# Patient Record
Sex: FEMALE | Race: WHITE | NOT HISPANIC OR LATINO | Employment: OTHER | URBAN - METROPOLITAN AREA
[De-identification: names, ages, dates, MRNs, and addresses within clinical notes are randomized per-mention and may not be internally consistent; named-entity substitution may affect disease eponyms.]

---

## 2023-02-27 LAB
THYROTROPIN (MIU/L) IN SER/PLAS BY DETECTION LIMIT <= 0.05 MIU/L: 2.26 MIU/L (ref 0.44–3.98)
THYROXINE (T4) FREE (NG/DL) IN SER/PLAS: 1.27 NG/DL (ref 0.78–1.48)
TRIIODOTHYRONINE (T3) FREE (PG/ML) IN SER/PLAS: 2.4 PG/ML (ref 2.3–4.2)

## 2023-04-03 DIAGNOSIS — F41.9 ANXIETY: Primary | ICD-10-CM

## 2023-04-03 DIAGNOSIS — M54.16 LUMBAR RADICULOPATHY: Primary | ICD-10-CM

## 2023-04-03 RX ORDER — GABAPENTIN 300 MG/1
300 CAPSULE ORAL 2 TIMES DAILY
Qty: 30 CAPSULE | Refills: 1 | Status: SHIPPED | OUTPATIENT
Start: 2023-04-03 | End: 2023-07-31 | Stop reason: SDUPTHER

## 2023-04-03 RX ORDER — ALPRAZOLAM 0.25 MG/1
0.25 TABLET ORAL DAILY PRN
Qty: 1 TABLET | Refills: 1 | Status: SHIPPED | OUTPATIENT
Start: 2023-04-03 | End: 2023-10-16 | Stop reason: ALTCHOICE

## 2023-04-03 RX ORDER — GABAPENTIN 300 MG/1
1 CAPSULE ORAL 2 TIMES DAILY
COMMUNITY
Start: 2023-02-27 | End: 2023-04-03 | Stop reason: SDUPTHER

## 2023-04-21 ENCOUNTER — TELEPHONE (OUTPATIENT)
Dept: PRIMARY CARE | Facility: CLINIC | Age: 73
End: 2023-04-21
Payer: MEDICARE

## 2023-04-24 ENCOUNTER — APPOINTMENT (OUTPATIENT)
Dept: PRIMARY CARE | Facility: CLINIC | Age: 73
End: 2023-04-24
Payer: MEDICARE

## 2023-04-28 ENCOUNTER — OFFICE VISIT (OUTPATIENT)
Dept: PRIMARY CARE | Facility: CLINIC | Age: 73
End: 2023-04-28
Payer: MEDICARE

## 2023-04-28 VITALS
WEIGHT: 249 LBS | TEMPERATURE: 97.6 F | HEART RATE: 72 BPM | DIASTOLIC BLOOD PRESSURE: 80 MMHG | SYSTOLIC BLOOD PRESSURE: 137 MMHG

## 2023-04-28 DIAGNOSIS — M48.061 NEUROFORAMINAL STENOSIS OF LUMBAR SPINE: ICD-10-CM

## 2023-04-28 DIAGNOSIS — M48.062 SPINAL STENOSIS OF LUMBAR REGION WITH NEUROGENIC CLAUDICATION: Primary | ICD-10-CM

## 2023-04-28 PROCEDURE — 1159F MED LIST DOCD IN RCRD: CPT | Performed by: FAMILY MEDICINE

## 2023-04-28 PROCEDURE — 1036F TOBACCO NON-USER: CPT | Performed by: FAMILY MEDICINE

## 2023-04-28 PROCEDURE — 99214 OFFICE O/P EST MOD 30 MIN: CPT | Performed by: FAMILY MEDICINE

## 2023-04-28 RX ORDER — AMLODIPINE BESYLATE 5 MG/1
5 TABLET ORAL
COMMUNITY
End: 2023-06-30 | Stop reason: SDUPTHER

## 2023-04-28 RX ORDER — BENAZEPRIL HYDROCHLORIDE 20 MG/1
20 TABLET ORAL
COMMUNITY
End: 2023-06-30 | Stop reason: SDUPTHER

## 2023-04-28 RX ORDER — GABAPENTIN 300 MG/1
300 CAPSULE ORAL 3 TIMES DAILY
Qty: 90 CAPSULE | Refills: 2 | Status: SHIPPED | OUTPATIENT
Start: 2023-04-28 | End: 2023-07-31

## 2023-04-28 RX ORDER — ATORVASTATIN CALCIUM 20 MG/1
20 TABLET, FILM COATED ORAL
COMMUNITY
End: 2023-06-30 | Stop reason: SDUPTHER

## 2023-04-28 RX ORDER — TRIAMTERENE AND HYDROCHLOROTHIAZIDE 37.5; 25 MG/1; MG/1
1 CAPSULE ORAL DAILY
COMMUNITY
Start: 2006-09-13 | End: 2023-06-30 | Stop reason: SDUPTHER

## 2023-04-28 RX ORDER — IBUPROFEN 600 MG/1
600 TABLET ORAL EVERY 6 HOURS PRN
COMMUNITY
Start: 2023-02-13 | End: 2023-10-16 | Stop reason: ALTCHOICE

## 2023-04-28 RX ORDER — METOPROLOL SUCCINATE 25 MG/1
25 TABLET, EXTENDED RELEASE ORAL DAILY
COMMUNITY
Start: 2023-03-31 | End: 2023-06-30 | Stop reason: SDUPTHER

## 2023-04-28 RX ORDER — OXYBUTYNIN CHLORIDE 5 MG/1
5 TABLET, EXTENDED RELEASE ORAL DAILY
COMMUNITY
Start: 2023-03-31 | End: 2023-06-30 | Stop reason: SDUPTHER

## 2023-04-28 RX ORDER — FUROSEMIDE 40 MG/1
40 TABLET ORAL
COMMUNITY
Start: 2023-02-13 | End: 2023-10-16 | Stop reason: ALTCHOICE

## 2023-04-28 ASSESSMENT — PATIENT HEALTH QUESTIONNAIRE - PHQ9
1. LITTLE INTEREST OR PLEASURE IN DOING THINGS: NOT AT ALL
SUM OF ALL RESPONSES TO PHQ9 QUESTIONS 1 AND 2: 0
2. FEELING DOWN, DEPRESSED OR HOPELESS: NOT AT ALL

## 2023-04-28 NOTE — PATIENT INSTRUCTIONS
Follow-up with medical spine for further evaluation  Increase gabapentin to 300 mg 3 times a day  Follow-up if symptoms worsen or she develops numbness tingling weakness incontinence of urine incontinence of stool

## 2023-04-28 NOTE — PROGRESS NOTES
72-year-old female with history of scoliosis and obesity was seen in February 2023 for left burning leg pain which had improved with gabapentin.  Underwent MRI which showed severe lumbar degenerative disc disease at multiple levels with canal stenosis and multilevel neuroforaminal stenosis.  She had stopped her gabapentin several days ago and has noticed increased pain.  She denies incontinence of urine incontinence of stool.  When she was on gabapentin 30 mg twice a day she had significantly improved but not complete pain relief        /80 (BP Location: Left arm, Patient Position: Sitting, BP Cuff Size: Large adult)   Pulse 72   Temp 36.4 °C (97.6 °F) (Temporal)   Wt 113 kg (249 lb)     Obese  Pleasant appears comfortable today ambulating with a limp with a cane  Skin without pallor or cyanosis  Abdomen nontender  No CVA tenderness  No midline lumbar tenderness paralumbar tenderness or spasm  Negative leg raise negative leg rolling  Quadriceps hamstrings foot dorsiflexion foot plantarflexion 5 out of 5 bilaterally  Patella and Achilles reflexes 1+ bilaterally  Bilateral chronic lower extremity edema with palpable pulses

## 2023-05-01 ENCOUNTER — TELEPHONE (OUTPATIENT)
Dept: PRIMARY CARE | Facility: CLINIC | Age: 73
End: 2023-05-01
Payer: MEDICARE

## 2023-05-01 NOTE — TELEPHONE ENCOUNTER
Patient was in on 4.28.23. She got a handwritten order for 5 year Disability Placard on prescription pad. The order was accidentally dated for 2024 instead of 2023. Can she please have a new order dated correctly mailed to her?

## 2023-05-04 NOTE — TELEPHONE ENCOUNTER
Follow up appointment on 4/28/23.----- Message from Richardson Givens MD sent at 4/19/2023 10:03 AM EDT -----  MRI is abnormal showing lumbar canal stenosis degenerative disc disease lumbar disc protrusions so she needs follow-up appointment.  Please schedule

## 2023-06-01 ENCOUNTER — TELEPHONE (OUTPATIENT)
Dept: PRIMARY CARE | Facility: CLINIC | Age: 73
End: 2023-06-01
Payer: MEDICARE

## 2023-06-01 NOTE — TELEPHONE ENCOUNTER
I will not be able to see her until I return.  The right leg pain can be related to her spine issues.

## 2023-06-01 NOTE — TELEPHONE ENCOUNTER
Patient said she is experiencing extreme pain in right leg in the thigh area. Patient can't sit because it hurts so much. She wondering if it could it be related to her spine issues?    She said she couldn't get in to see spine specialist until June 30th. She wouldn't mind coming in if she needed that, but it might have to be virtual because she can't drive for more than 10 minutes at a time.

## 2023-06-08 NOTE — TELEPHONE ENCOUNTER
I told her the pain could relate to her spine. She is going to look and see if she can get on a waiting list. She didn't want to schedule a follow up at this time

## 2023-06-30 DIAGNOSIS — I10 ESSENTIAL HYPERTENSION: ICD-10-CM

## 2023-06-30 DIAGNOSIS — R32 URINARY INCONTINENCE, UNSPECIFIED TYPE: ICD-10-CM

## 2023-06-30 DIAGNOSIS — E78.5 HYPERLIPIDEMIA, UNSPECIFIED HYPERLIPIDEMIA TYPE: Primary | ICD-10-CM

## 2023-06-30 RX ORDER — ATORVASTATIN CALCIUM 20 MG/1
20 TABLET, FILM COATED ORAL
Qty: 90 TABLET | Refills: 0 | Status: SHIPPED | OUTPATIENT
Start: 2023-06-30 | End: 2023-10-06

## 2023-06-30 RX ORDER — BENAZEPRIL HYDROCHLORIDE 20 MG/1
20 TABLET ORAL
Qty: 90 TABLET | Refills: 0 | Status: SHIPPED | OUTPATIENT
Start: 2023-06-30 | End: 2023-10-06

## 2023-06-30 RX ORDER — AMLODIPINE BESYLATE 5 MG/1
5 TABLET ORAL
Qty: 90 TABLET | Refills: 0 | Status: SHIPPED | OUTPATIENT
Start: 2023-06-30 | End: 2023-10-06

## 2023-06-30 RX ORDER — METOPROLOL SUCCINATE 25 MG/1
25 TABLET, EXTENDED RELEASE ORAL DAILY
Qty: 90 TABLET | Refills: 0 | Status: SHIPPED | OUTPATIENT
Start: 2023-06-30 | End: 2023-10-06

## 2023-06-30 RX ORDER — OXYBUTYNIN CHLORIDE 5 MG/1
5 TABLET, EXTENDED RELEASE ORAL DAILY
Qty: 90 TABLET | Refills: 0 | Status: SHIPPED | OUTPATIENT
Start: 2023-06-30 | End: 2023-10-06

## 2023-06-30 RX ORDER — TRIAMTERENE AND HYDROCHLOROTHIAZIDE 37.5; 25 MG/1; MG/1
1 CAPSULE ORAL DAILY
Qty: 90 CAPSULE | Refills: 0 | Status: SHIPPED | OUTPATIENT
Start: 2023-06-30 | End: 2023-10-06

## 2023-07-31 DIAGNOSIS — M54.16 LUMBAR RADICULOPATHY: ICD-10-CM

## 2023-07-31 RX ORDER — GABAPENTIN 300 MG/1
300 CAPSULE ORAL 2 TIMES DAILY
Qty: 30 CAPSULE | Refills: 1 | Status: SHIPPED | OUTPATIENT
Start: 2023-07-31 | End: 2023-08-01 | Stop reason: SDUPTHER

## 2023-08-01 DIAGNOSIS — M54.16 LUMBAR RADICULOPATHY: ICD-10-CM

## 2023-08-01 RX ORDER — GABAPENTIN 300 MG/1
600 CAPSULE ORAL 2 TIMES DAILY
Qty: 120 CAPSULE | Refills: 3 | Status: SHIPPED | OUTPATIENT
Start: 2023-08-01 | End: 2023-08-01 | Stop reason: SDUPTHER

## 2023-08-01 RX ORDER — GABAPENTIN 300 MG/1
600 CAPSULE ORAL 2 TIMES DAILY
Qty: 120 CAPSULE | Refills: 3 | Status: SHIPPED | OUTPATIENT
Start: 2023-08-01 | End: 2023-12-12 | Stop reason: SDUPTHER

## 2023-08-07 ENCOUNTER — HOSPITAL ENCOUNTER (OUTPATIENT)
Dept: DATA CONVERSION | Facility: HOSPITAL | Age: 73
End: 2023-08-07
Attending: PHYSICAL MEDICINE & REHABILITATION | Admitting: PHYSICAL MEDICINE & REHABILITATION
Payer: MEDICARE

## 2023-08-07 DIAGNOSIS — M48.062 SPINAL STENOSIS, LUMBAR REGION WITH NEUROGENIC CLAUDICATION: ICD-10-CM

## 2023-09-29 VITALS
DIASTOLIC BLOOD PRESSURE: 80 MMHG | HEART RATE: 57 BPM | TEMPERATURE: 96.8 F | SYSTOLIC BLOOD PRESSURE: 155 MMHG | RESPIRATION RATE: 16 BRPM

## 2023-09-30 NOTE — H&P
History of Present Illness:   History Present Illness:  Reason for surgery: Lumbar stenosis with radiculopathy  and neurogenic claudication   HPI:    Patient reports no change in symptoms since  H&P/previous evaluation.  No f/c/s, no change in medical problems or comorbidities.  Heart and lung evaluation is normal   and neurological examination is unchanged from previous .  Pain 7-8/10 at rest.      Allergies:        Allergies:  ·  No Known Allergies :     Home Medication Review:   Home Medications Reviewed: yes     Impression/Procedure:   ·  Impression and Planned Procedure: Lumbar stenosis with radiculopathy and neurogenic claudication for Right l3-4 and L4-5 TFESIs       ERAS (Enhanced Recovery After Surgery):  ·  ERAS Patient: no       Vital Signs:  Temperature C: 36 degrees C   Temperature F: 96.8 degrees F   Heart Rate: 57 beats per minute   Respiratory Rate: 16 breath per minute   Blood Pressure Systolic: 155 mm/Hg   Blood Pressure Diastolic: 80 mm/Hg     Physical Exam by System:    Constitutional: Well developed, awake/alert/oriented  x3, no distress, alert and cooperative   ENMT: mucous membranes moist, no apparent injury,  no lesions seen   Respiratory/Thorax: Patent airways, CTAB, normal  breath sounds with good chest expansion, thorax symmetric   Cardiovascular: Regular, rate and rhythm, no murmurs,  2+ equal pulses of the extremities, normal S 1and S 2   Musculoskeletal: ROM adeqate, no signficant joint  warmth or swelling   Neurological: alert and oriented x3, motor normal  strength in LEs or unchanged from previous. Confirmed nl neuro   Psychological: Appropriate mood and behavior   Skin: Warm and dry, no lesions, no rashes     Consent:   COVID-19 Consent:  ·  COVID-19 Risk Consent Surgeon has reviewed key risks related to the risk of val COVID-19 and if they contract COVID-19 what the risks are.       Electronic Signatures:  Reigno Ambrose)  (Signed 07-Aug-2023 13:19)   Authored:  History of Present Illness, Allergies, Home  Medication Review, Impression/Procedure, ERAS, Physical Exam, Consent, Note Completion      Last Updated: 07-Aug-2023 13:19 by Regino Ambrose)

## 2023-10-01 NOTE — OP NOTE
PROCEDURE DETAILS    Preoperative Diagnosis:  Neurogenic claudication due to lumbar spinal stenosis, M48.062    Postoperative Diagnosis:  Neurogenic claudication due to lumbar spinal stenosis, M48.062    Surgeon: Regino Ambrose  Resident/Fellow/Other Assistant: None of these were associated with this case    Procedure:  1.  RIGHT L3-4 AND L4-5 TRANSFORAMINAL EPIDURAL STEROID INJECTION WITH FLUORO AND IV SEDATION     Anesthesia: No anesthesiologist associated with this case  Estimated Blood Loss: 0  Findings: below        Operative Report:   Sedation: None    The patient was then taken back to the procedure room and was placed in a prone position and routine noninvasive monitors were applied.  A timeout was performed verifying patient identification, site and allergies.   The back was prepped and draped in  a sterile fashion. Under direct fluoroscopic visualization, the Right L4 transverse process was identified. The skin and subcutaneous tissues were anesthetized with 2cc of lidocaine and 0.5 cc sodium bicarbonate . Under direct fluoroscopic guidance,   a 5 inch, 22 gauge spinal needle was directed obliquely to contact the inferior aspect of the transverse process and then directed to enter the neural foramen.  Needle placement was confirmed on both AP and lateral views prior to the contrast injection.  Omnipaque 2 cc was injected,  confirming epiradicular flow pattern and highlighting the L4 spinal nerve. The injection was completed with 0.75  cc of DEXAMETHASONE 10mg/cc and 2.5 cc of lidocaine, preservative free.    Attention was then turned to the Right L3 transverse process,  and the subcutaneous tissue anesthetized in the same fashion as above. Under direct fluoroscopic guidance,  a 5  inch, 22 gauge spinal needle was directed obliquely to contact the inferior  aspect of the transverse process and then directed to enter the neural foramen.  Needle placement was confirmed on both AP and lateral views  prior to the contrast injection. Omnipaque 1 cc was injected,  confirming epiradicular flow pattern and highlighting  the L3 spinal nerve. The injection was completed with 0.75  cc of DEXAMETHASONE 10mg/cc and 2.5 cc of 2% lidocaine, preservative free.    The patient tolerated the procedure well and without complications and was taken back to the recovery area in good condition.  Post procedure care,  precautions and instructions given and patient verbalized understanding.                        Attestation:   Note Completion:  Attending Attestation I performed the procedure without a resident         Electronic Signatures:  Regino Ambrose)  (Signed 07-Aug-2023 14:29)   Authored: Post-Operative Note, Chart Review, Note Completion      Last Updated: 07-Aug-2023 14:29 by Regino Ambrose)

## 2023-10-05 DIAGNOSIS — R32 URINARY INCONTINENCE, UNSPECIFIED TYPE: ICD-10-CM

## 2023-10-05 DIAGNOSIS — E78.5 HYPERLIPIDEMIA, UNSPECIFIED HYPERLIPIDEMIA TYPE: ICD-10-CM

## 2023-10-05 DIAGNOSIS — I10 ESSENTIAL HYPERTENSION: ICD-10-CM

## 2023-10-06 DIAGNOSIS — I10 ESSENTIAL (PRIMARY) HYPERTENSION: ICD-10-CM

## 2023-10-06 DIAGNOSIS — N17.9 ACUTE KIDNEY FAILURE, UNSPECIFIED (CMS-HCC): Primary | ICD-10-CM

## 2023-10-06 RX ORDER — OXYBUTYNIN CHLORIDE 5 MG/1
5 TABLET, EXTENDED RELEASE ORAL DAILY
Qty: 90 TABLET | Refills: 0 | Status: SHIPPED | OUTPATIENT
Start: 2023-10-06

## 2023-10-06 RX ORDER — TRIAMTERENE AND HYDROCHLOROTHIAZIDE 37.5; 25 MG/1; MG/1
1 CAPSULE ORAL DAILY
Qty: 90 CAPSULE | Refills: 0 | Status: SHIPPED | OUTPATIENT
Start: 2023-10-06 | End: 2023-12-13 | Stop reason: SDUPTHER

## 2023-10-06 RX ORDER — ATORVASTATIN CALCIUM 20 MG/1
20 TABLET, FILM COATED ORAL DAILY
Qty: 90 TABLET | Refills: 0 | Status: SHIPPED | OUTPATIENT
Start: 2023-10-06 | End: 2024-04-02 | Stop reason: SDUPTHER

## 2023-10-06 RX ORDER — BENAZEPRIL HYDROCHLORIDE 20 MG/1
20 TABLET ORAL DAILY
Qty: 90 TABLET | Refills: 0 | Status: SHIPPED | OUTPATIENT
Start: 2023-10-06 | End: 2023-12-13 | Stop reason: SDUPTHER

## 2023-10-06 RX ORDER — METOPROLOL SUCCINATE 25 MG/1
25 TABLET, EXTENDED RELEASE ORAL DAILY
Qty: 90 TABLET | Refills: 0 | Status: SHIPPED | OUTPATIENT
Start: 2023-10-06 | End: 2023-12-12 | Stop reason: WASHOUT

## 2023-10-06 RX ORDER — AMLODIPINE BESYLATE 5 MG/1
5 TABLET ORAL DAILY
Qty: 90 TABLET | Refills: 0 | Status: SHIPPED | OUTPATIENT
Start: 2023-10-06 | End: 2023-12-13 | Stop reason: SDUPTHER

## 2023-10-13 PROBLEM — M51.37 DEGENERATIVE DISC DISEASE AT L5-S1 LEVEL: Status: ACTIVE | Noted: 2023-10-13

## 2023-10-13 PROBLEM — M51.379 DEGENERATIVE DISC DISEASE AT L5-S1 LEVEL: Status: ACTIVE | Noted: 2023-10-13

## 2023-10-13 PROBLEM — R93.7 ABNORMAL BONE XRAY: Status: ACTIVE | Noted: 2023-10-13

## 2023-10-13 PROBLEM — M54.16 LUMBAR RADICULAR PAIN: Status: ACTIVE | Noted: 2023-10-13

## 2023-10-13 PROBLEM — M48.062 SPINAL STENOSIS OF LUMBAR REGION WITH NEUROGENIC CLAUDICATION: Status: ACTIVE | Noted: 2023-10-13

## 2023-10-13 PROBLEM — M17.11 PRIMARY OSTEOARTHRITIS OF RIGHT KNEE: Status: ACTIVE | Noted: 2023-10-13

## 2023-10-13 RX ORDER — TRAMADOL HYDROCHLORIDE 50 MG/1
1 TABLET ORAL EVERY 6 HOURS
COMMUNITY
Start: 2023-02-14 | End: 2023-10-16 | Stop reason: ALTCHOICE

## 2023-10-13 RX ORDER — IBUPROFEN 800 MG/1
800 TABLET ORAL EVERY 6 HOURS PRN
COMMUNITY
Start: 2023-02-09 | End: 2023-10-16 | Stop reason: ALTCHOICE

## 2023-10-13 RX ORDER — NABUMETONE 500 MG/1
1 TABLET, FILM COATED ORAL 2 TIMES DAILY
COMMUNITY
Start: 2023-02-13 | End: 2023-10-16 | Stop reason: ALTCHOICE

## 2023-10-13 RX ORDER — HYDROCODONE BITARTRATE AND ACETAMINOPHEN 5; 325 MG/1; MG/1
1 TABLET ORAL EVERY 6 HOURS PRN
COMMUNITY
Start: 2023-02-20 | End: 2023-10-16 | Stop reason: ALTCHOICE

## 2023-10-13 RX ORDER — METHYLPREDNISOLONE 4 MG/1
TABLET ORAL
COMMUNITY
Start: 2023-02-27 | End: 2023-10-16 | Stop reason: ALTCHOICE

## 2023-10-16 ENCOUNTER — OFFICE VISIT (OUTPATIENT)
Dept: PRIMARY CARE | Facility: CLINIC | Age: 73
End: 2023-10-16
Payer: MEDICARE

## 2023-10-16 ENCOUNTER — OFFICE VISIT (OUTPATIENT)
Dept: ORTHOPEDIC SURGERY | Facility: CLINIC | Age: 73
End: 2023-10-16
Payer: MEDICARE

## 2023-10-16 ENCOUNTER — LAB (OUTPATIENT)
Dept: LAB | Facility: LAB | Age: 73
End: 2023-10-16
Payer: MEDICARE

## 2023-10-16 VITALS
HEIGHT: 64 IN | DIASTOLIC BLOOD PRESSURE: 62 MMHG | TEMPERATURE: 96.9 F | SYSTOLIC BLOOD PRESSURE: 95 MMHG | WEIGHT: 252 LBS | HEART RATE: 56 BPM | OXYGEN SATURATION: 95 % | BODY MASS INDEX: 43.02 KG/M2

## 2023-10-16 DIAGNOSIS — M17.11 PRIMARY OSTEOARTHRITIS OF RIGHT KNEE: ICD-10-CM

## 2023-10-16 DIAGNOSIS — I10 ESSENTIAL (PRIMARY) HYPERTENSION: ICD-10-CM

## 2023-10-16 DIAGNOSIS — I10 ESSENTIAL HYPERTENSION: Primary | ICD-10-CM

## 2023-10-16 DIAGNOSIS — M51.37 DEGENERATIVE DISC DISEASE AT L5-S1 LEVEL: ICD-10-CM

## 2023-10-16 DIAGNOSIS — N18.32 STAGE 3B CHRONIC KIDNEY DISEASE (MULTI): ICD-10-CM

## 2023-10-16 DIAGNOSIS — M79.18 CHRONIC MYOFASCIAL PAIN: Primary | ICD-10-CM

## 2023-10-16 DIAGNOSIS — G89.29 CHRONIC MYOFASCIAL PAIN: Primary | ICD-10-CM

## 2023-10-16 DIAGNOSIS — N17.9 ACUTE KIDNEY FAILURE, UNSPECIFIED (CMS-HCC): ICD-10-CM

## 2023-10-16 DIAGNOSIS — I10 ESSENTIAL HYPERTENSION: ICD-10-CM

## 2023-10-16 LAB
ERYTHROCYTE [DISTWIDTH] IN BLOOD BY AUTOMATED COUNT: 14.9 % (ref 11.5–14.5)
HCT VFR BLD AUTO: 40.5 % (ref 36–46)
HGB BLD-MCNC: 12.5 G/DL (ref 12–16)
MCH RBC QN AUTO: 27.1 PG (ref 26–34)
MCHC RBC AUTO-ENTMCNC: 30.9 G/DL (ref 32–36)
MCV RBC AUTO: 88 FL (ref 80–100)
NRBC BLD-RTO: 0 /100 WBCS (ref 0–0)
PLATELET # BLD AUTO: 240 X10*3/UL (ref 150–450)
PMV BLD AUTO: 10.1 FL (ref 7.5–11.5)
RBC # BLD AUTO: 4.61 X10*6/UL (ref 4–5.2)
WBC # BLD AUTO: 9.7 X10*3/UL (ref 4.4–11.3)

## 2023-10-16 PROCEDURE — 80053 COMPREHEN METABOLIC PANEL: CPT

## 2023-10-16 PROCEDURE — 85027 COMPLETE CBC AUTOMATED: CPT

## 2023-10-16 PROCEDURE — 36415 COLL VENOUS BLD VENIPUNCTURE: CPT

## 2023-10-16 PROCEDURE — 1036F TOBACCO NON-USER: CPT | Performed by: PHYSICAL MEDICINE & REHABILITATION

## 2023-10-16 PROCEDURE — 3078F DIAST BP <80 MM HG: CPT | Performed by: FAMILY MEDICINE

## 2023-10-16 PROCEDURE — 80061 LIPID PANEL: CPT

## 2023-10-16 PROCEDURE — 99214 OFFICE O/P EST MOD 30 MIN: CPT | Performed by: FAMILY MEDICINE

## 2023-10-16 PROCEDURE — 3074F SYST BP LT 130 MM HG: CPT | Performed by: FAMILY MEDICINE

## 2023-10-16 PROCEDURE — 1159F MED LIST DOCD IN RCRD: CPT | Performed by: PHYSICAL MEDICINE & REHABILITATION

## 2023-10-16 PROCEDURE — 99213 OFFICE O/P EST LOW 20 MIN: CPT | Performed by: PHYSICAL MEDICINE & REHABILITATION

## 2023-10-16 PROCEDURE — 1036F TOBACCO NON-USER: CPT | Performed by: FAMILY MEDICINE

## 2023-10-16 PROCEDURE — 1160F RVW MEDS BY RX/DR IN RCRD: CPT | Performed by: FAMILY MEDICINE

## 2023-10-16 PROCEDURE — 1159F MED LIST DOCD IN RCRD: CPT | Performed by: FAMILY MEDICINE

## 2023-10-16 RX ORDER — METHOCARBAMOL 500 MG/1
TABLET, FILM COATED ORAL
Qty: 60 TABLET | Refills: 2 | Status: SHIPPED | OUTPATIENT
Start: 2023-10-16 | End: 2023-12-01 | Stop reason: SDUPTHER

## 2023-10-16 ASSESSMENT — PATIENT HEALTH QUESTIONNAIRE - PHQ9
1. LITTLE INTEREST OR PLEASURE IN DOING THINGS: NOT AT ALL
SUM OF ALL RESPONSES TO PHQ9 QUESTIONS 1 & 2: 0
2. FEELING DOWN, DEPRESSED OR HOPELESS: NOT AT ALL

## 2023-10-16 ASSESSMENT — COLUMBIA-SUICIDE SEVERITY RATING SCALE - C-SSRS
2. HAVE YOU ACTUALLY HAD ANY THOUGHTS OF KILLING YOURSELF?: NO
1. IN THE PAST MONTH, HAVE YOU WISHED YOU WERE DEAD OR WISHED YOU COULD GO TO SLEEP AND NOT WAKE UP?: NO
6. HAVE YOU EVER DONE ANYTHING, STARTED TO DO ANYTHING, OR PREPARED TO DO ANYTHING TO END YOUR LIFE?: NO

## 2023-10-16 ASSESSMENT — LIFESTYLE VARIABLES
HOW MANY STANDARD DRINKS CONTAINING ALCOHOL DO YOU HAVE ON A TYPICAL DAY: 1 OR 2
HOW OFTEN DO YOU HAVE SIX OR MORE DRINKS ON ONE OCCASION: NEVER
SKIP TO QUESTIONS 9-10: 1
AUDIT-C TOTAL SCORE: 2
HOW OFTEN DO YOU HAVE A DRINK CONTAINING ALCOHOL: 2-4 TIMES A MONTH

## 2023-10-16 NOTE — PROGRESS NOTES
Follow Up Note    Today's Date:   10/16/2023     Assessment: Very pleasant 72-year-old female with chronic right knee pain and posterior thigh pain. Also history of severe lumbar central canal stenosis and multilevel degenerative changes throughout the lumbosacral spine. Pain can be intermittently severe and impact her daily activity. Fortunately she denies any worsening neurological deficits at this time.    10/16/23 Update: Has not started PT, recommended she start. Will add further medications to conservative management. Pain likely myofascial in nature, possible element of radicular symptoms and/or osteoarthritis.  New Rx provided for methocarbamol as needed-advised potential sedate of side effects.    -Chronic myofascial pain  -Right knee osteoarthritis  -Severe lumbar stenosis and foraminal narrowing     PLAN:  1)  Imaging/Diagnostic Studies: X-ray right knee 8- shows mild tricompartmental osteoarthritis.  MRI lumbar from 04/2023 reviewed at last visit.   2)  Therapy/Rehabilitation: Still recommend PT, referral in place from last visit.  3)  Pharmacological Management: Start Robaxin PRN.  Discuss increasing gabapentin with original prescriber.  4)  Spine/Surgical Interventions: Consider repeat lumbar KAUSHAL if not improved.  5)  Alternative Treatments: May consider alternative treatment options in the future including manipulation (chiropractor versus osteopathic) and/or acupuncture if patient does not obtain optimal relief with initial treatment plan.  6)  Consultations: PT  7)  Follow -up: as needed  8)  Future treatment considerations: Consider lower extremity EMG if not improving.  Consider right knee corticosteroid injection.    Patient advised of the difference between hurt and harm and advised to continue with all normal activities and exercises. Patient verbalized understanding of the above plan and was happy with the care provided.      The above clinical summary has been dictated with voice  recognition software. It has not been proofread for grammatical errors, typographical mistakes, or other semantic inconsistencies.    Thank you for visiting our office today. It was our pleasure to take part in your healthcare.     Do not hesitate to call with any questions regarding your plan of care after leaving at [phone]    To clinicians, thank you very much for this kind referral. It is a privilege to partner with you in the care of your patients. My office would be delighted to assist you with any further consultations or with questions regarding the plan of care outlined. Do not hesitate to call the office or contact me directly.     Sincerely,    BRENDA Blue MD  , Physical Medicine and Rehabilitation, Orthopedic Spine  [facility] [facility] [employer]  [facility]    Seen with resident Dr. Shukla, note above and below is a joint effort in all areas.    I saw and evaluated the patient. I personally obtained the key and critical portions of the history and physical exam. I reviewed the resident's documentation and discussed the patient with the resident. I agree with the resident's medical decision making as documented in the resident's note, with my additions.      Paty Maldonado  is a 72 y.o. female   who was last seen 8/27/23.  Since the last visit the pain is unchanged. Still complains of low back pain, described as aching. Non-radiating. Still complains of R posterior thigh and R knee pain. Cannot fully flex knee. Slightly improved with Aleve. Takes gabapentin for separate neuropathy. She did not go to PT as recommended at last visit. Had lumbar KAUSHAL with Dr. Ambrose on 8/7/23.     TREATMENTS  IN THE LAST SIX MONTHS     Active conservative therapy  in the last six months (see below)              1. Physical therapy: no                                                                                    2. Home exercise program after  PT: no                                                     3. A physician supervised home exercise program (HEP): no               4. Chiropractic Care: no                                                                    Passive conservative therapy  in the last six months (see below)              1. NSAIDS: Aleve                                                                                                           2. Prescription pain medication: no                                                             3. Acupuncture: no                                                                                           4. Tens unit: no     Denies numbness/tingling. Patient denies bowel/bladder incontinence, denies fever, denies unintentional weight loss, denies clumsiness of hands, feet, or dropping things.  Denies any constitutional or myelopathic symptomatology.     ROS: Other than listed in HPI, PMHX below, the patient denies any complaint of the following: night pain, joint swelling, myalgias, cough, fainting, vision or language changes, shortness of breath, chest pain, nausea, vomiting or diarrhea, rash, dysuria, seizures, excessive sweating, or bleeding problems.    I have confirmed and edited as necessary Past Medical, Past Surgical, Family, Social History and ROS as obtained by others. No new sig. changes since last visit.       PHYSICAL EXAM:   GENERAL APPEARANCE:  Well nourished, well developed, and no apparent distress.  NEURO PSYCH: Patient oriented to person, place, Mood pleasant. Benign affect.  MUSCULOSKELETAL and NEUROLOGICAL       VISUAL INSPECTION           LUMBAR: WNL  SPINE ROM:   LUMBAR ROM: Mildly limited R lateral flexion and Rward rotation       PALPATION:           SPINOUS PROCESS: Nontender           PARASPINALS: Nontender           R KNEE: No significant effusion, mild peripatellar  and medial joint line tenderness.  FACET LOADING: Negative  MUSCLE BULK: Normal and symmetrical in the  lower extremities.  MUSCLE TONE: Normal  MOTOR: 5/5 in all muscle groups tested in bilateral lower extremities   SENSORY: Normal sensory exam to light touch  GAIT: Antalgic, favoring L leg. Trendelenburg gait, with L hip dropping.   STRAIGHT LEG TEST: Neg  PERIPHERAL JOINT ROM:   HIP ROM: Full bilaterally  KNEE ROM: Full bilaterally  KNEE ANT/POST DRAWER & VALGUS/VARUS STRESS: Negative for laxity b/l  TAYA: Negative b/l  MONICA/Thigh Thrust/Compression/Rudolph Finger:  Negative bilaterally   Hip Exam including thigh thrust and LOG ROLL: Negative bilaterally      DATA REVIEW:   The below imaging studies were personally reviewed and discussed with the patient.    Medical Decision Making:  The above note constitutes a Moderate to High level of medical decision making based on past data and imaging review, new and chronic symptoms with exacerbation, change in weakness or sensation, new imaging and diagnostic studies ordered, discussion of potential interventional or surgical treatment options, acute or chronic pain that may pose a threat to bodily function.    Current Outpatient Medications on File Prior to Visit   Medication Sig Dispense Refill    ALPRAZolam (Xanax) 0.25 mg tablet Take 1 tablet (0.25 mg) by mouth once daily as needed for anxiety for up to 1 day. Take 1 hour before MRI 1 tablet 1    amLODIPine (Norvasc) 5 mg tablet TAKE 1 TABLET ONE TIME DAILY 90 tablet 0    atorvastatin (Lipitor) 20 mg tablet TAKE 1 TABLET ONE TIME DAILY 90 tablet 0    benazepril (Lotensin) 20 mg tablet TAKE 1 TABLET ONE TIME DAILY 90 tablet 0    furosemide (Lasix) 40 mg tablet Take 1 tablet (40 mg) by mouth once daily.      gabapentin (Neurontin) 300 mg capsule Take 2 capsules (600 mg) by mouth 2 times a day. 120 capsule 3    HYDROcodone-acetaminophen (Norco) 5-325 mg tablet Take 1 tablet by mouth every 6 hours if needed for severe pain (7 - 10).      ibuprofen 600 mg tablet Take 1 tablet (600 mg) by mouth every 6 hours if needed.       ibuprofen 800 mg tablet Take 1 tablet (800 mg) by mouth every 6 hours if needed.      methylPREDNISolone (Medrol Dospak) 4 mg tablets Take by mouth.      metoprolol succinate XL (Toprol-XL) 25 mg 24 hr tablet TAKE 1 TABLET ONE TIME DAILY 90 tablet 0    nabumetone (Relafen) 500 mg tablet Take 1 tablet (500 mg) by mouth 2 times a day.      oxybutynin XL (Ditropan-XL) 5 mg 24 hr tablet TAKE 1 TABLET ONE TIME DAILY 90 tablet 0    traMADol (Ultram) 50 mg tablet Take 1 tablet (50 mg) by mouth every 6 hours.      triamterene-hydrochlorothiazid (Dyazide) 37.5-25 mg capsule TAKE 1 CAPSULE EVERY DAY 90 capsule 0     No current facility-administered medications on file prior to visit.        No past medical history on file.     No past surgical history on file.     No Known Allergies

## 2023-10-16 NOTE — PROGRESS NOTES
"72-year-old female with history of scoliosis and obesity was seen in February 2023 for left burning leg pain which had improved with gabapentin.  Underwent MRI which showed severe lumbar degenerative disc disease at multiple levels with canal stenosis and multilevel neuroforaminal stenosis.  She had stopped her gabapentin several days ago and has noticed increased pain.  She denies incontinence of urine incontinence of stool.  When she was on gabapentin 600 mg twice a day she had significantly improved but not complete pain relief. She had epidural ijection x 1 without improvement    She is here for blood pressure follow-up as well.  Blood pressure is notably low complaining of some lightheadedness at times but denies chest pain SOB WILKERSON diaphoresis nausea palpitations syncope presyncope orthopnea edema leg pain dysarthria aphasia focal weakness headache numbness tingling  visual disturbance gait disturbance polydipsia polyuria weight loss tremor epistaxis melena rectal bleeding tremor fatigue weight change temperature intolerance.        BP 95/62   Pulse 56   Temp 36.1 °C (96.9 °F)   Ht 1.626 m (5' 4\")   Wt 114 kg (252 lb)   SpO2 95%   BMI 43.26 kg/m²       Appears comfortable  No retractions  Skin without pallor petechia icterus cyanosis  Neck without JVD thyromegaly bruits  Chest wall nontender  Chest clear to auscultation without rales rhonchi wheeze  Heart regular rate and rhythm without murmur  Abdomen soft nondistended nontender without organomegaly or mass  Extremities without erythema edema Homans or cord  Peripheral pulses palpable  Neuro intact        "

## 2023-10-17 LAB
ALBUMIN SERPL BCP-MCNC: 4.2 G/DL (ref 3.4–5)
ALP SERPL-CCNC: 128 U/L (ref 33–136)
ALT SERPL W P-5'-P-CCNC: 15 U/L (ref 7–45)
ANION GAP SERPL CALC-SCNC: 14 MMOL/L (ref 10–20)
AST SERPL W P-5'-P-CCNC: 16 U/L (ref 9–39)
BILIRUB SERPL-MCNC: 0.8 MG/DL (ref 0–1.2)
BUN SERPL-MCNC: 45 MG/DL (ref 6–23)
CALCIUM SERPL-MCNC: 9.8 MG/DL (ref 8.6–10.6)
CHLORIDE SERPL-SCNC: 107 MMOL/L (ref 98–107)
CHOLEST SERPL-MCNC: 146 MG/DL (ref 0–199)
CHOLESTEROL/HDL RATIO: 3.8
CO2 SERPL-SCNC: 26 MMOL/L (ref 21–32)
CREAT SERPL-MCNC: 1.4 MG/DL (ref 0.5–1.05)
GFR SERPL CREATININE-BSD FRML MDRD: 40 ML/MIN/1.73M*2
GLUCOSE SERPL-MCNC: 104 MG/DL (ref 74–99)
HDLC SERPL-MCNC: 38.7 MG/DL
LDLC SERPL CALC-MCNC: 86 MG/DL
NON HDL CHOLESTEROL: 107 MG/DL (ref 0–149)
POTASSIUM SERPL-SCNC: 4.4 MMOL/L (ref 3.5–5.3)
PROT SERPL-MCNC: 7.1 G/DL (ref 6.4–8.2)
SODIUM SERPL-SCNC: 143 MMOL/L (ref 136–145)
TRIGL SERPL-MCNC: 107 MG/DL (ref 0–149)
VLDL: 21 MG/DL (ref 0–40)

## 2023-12-01 DIAGNOSIS — M79.18 CHRONIC MYOFASCIAL PAIN: ICD-10-CM

## 2023-12-01 DIAGNOSIS — G89.29 CHRONIC MYOFASCIAL PAIN: ICD-10-CM

## 2023-12-01 RX ORDER — METHOCARBAMOL 500 MG/1
TABLET, FILM COATED ORAL
Qty: 60 TABLET | Refills: 2 | Status: SHIPPED | OUTPATIENT
Start: 2023-12-01 | End: 2024-04-16 | Stop reason: WASHOUT

## 2023-12-11 ENCOUNTER — TELEPHONE (OUTPATIENT)
Dept: PRIMARY CARE | Facility: CLINIC | Age: 73
End: 2023-12-11
Payer: MEDICARE

## 2023-12-11 NOTE — TELEPHONE ENCOUNTER
Pt is coming in tomorrow if you wanted to wait. She wants to get these through mail order now that is why she is requesting a bit early.    Name:  Paty Maldonado  :  [id number]  Medication Name:  Amlodipine  Dose : 5 Mg  Route : tablet  Frequency : 1 per day  Quantity : 90 tablets  Directions : Take one tablet one time daily  Specific Pharmacy location:  [facility], on file  Date of last appointment:  10/16/23  Date of next appointment:  23    Medication Name:  Triamterene-Hydrochlorothiazide  Dose : 37.5-25 Mg  Route : Capsule  Frequency : 1 per day  Quantity : 90 Capsules  Directions : Take one capsule every day  Specific Pharmacy location:  [facility], on file    Medication Name:  Oxybutynin  Dose : 5 Mg  Route : Tablet  Frequency : 1 per day  Quantity : 90 tablets  Directions : Take one tablet one time daily  Specific Pharmacy location:  [facility], on file    Medication Name:  Benazepril  Dose : 20 Mg  Route : tablet  Frequency : 1 per day  Quantity : 90 tablets  Directions : Take one tablet one time daily  Specific Pharmacy location:  [facility], on file    Medication Name:  Atorvastatin  Dose : 20 Mg  Route : tablet  Frequency : 1 per day  Quantity : 90 tablets  Directions : Take one tablet one time daily  Specific Pharmacy location:  [facility], on file    Medication Name:  Gabapentin  Dose : 300 Mg  Route : Capsule  Frequency : 4 per day  Quantity : 120 capsules  Directions : Take 2 capsules by mouth 2 times a day  Specific Pharmacy location:  [facility], on file

## 2023-12-12 ENCOUNTER — OFFICE VISIT (OUTPATIENT)
Dept: PRIMARY CARE | Facility: CLINIC | Age: 73
End: 2023-12-12
Payer: MEDICARE

## 2023-12-12 VITALS
BODY MASS INDEX: 42.34 KG/M2 | TEMPERATURE: 97.2 F | WEIGHT: 248 LBS | OXYGEN SATURATION: 97 % | HEART RATE: 71 BPM | SYSTOLIC BLOOD PRESSURE: 134 MMHG | HEIGHT: 64 IN | DIASTOLIC BLOOD PRESSURE: 85 MMHG

## 2023-12-12 DIAGNOSIS — E78.5 HYPERLIPIDEMIA, UNSPECIFIED HYPERLIPIDEMIA TYPE: ICD-10-CM

## 2023-12-12 DIAGNOSIS — M54.16 LUMBAR RADICULOPATHY: ICD-10-CM

## 2023-12-12 DIAGNOSIS — M48.062 SPINAL STENOSIS OF LUMBAR REGION WITH NEUROGENIC CLAUDICATION: Primary | ICD-10-CM

## 2023-12-12 DIAGNOSIS — I10 ESSENTIAL HYPERTENSION: ICD-10-CM

## 2023-12-12 PROCEDURE — 99214 OFFICE O/P EST MOD 30 MIN: CPT | Performed by: FAMILY MEDICINE

## 2023-12-12 PROCEDURE — 3079F DIAST BP 80-89 MM HG: CPT | Performed by: FAMILY MEDICINE

## 2023-12-12 PROCEDURE — 1159F MED LIST DOCD IN RCRD: CPT | Performed by: FAMILY MEDICINE

## 2023-12-12 PROCEDURE — 1160F RVW MEDS BY RX/DR IN RCRD: CPT | Performed by: FAMILY MEDICINE

## 2023-12-12 PROCEDURE — 1036F TOBACCO NON-USER: CPT | Performed by: FAMILY MEDICINE

## 2023-12-12 PROCEDURE — 3075F SYST BP GE 130 - 139MM HG: CPT | Performed by: FAMILY MEDICINE

## 2023-12-12 RX ORDER — GABAPENTIN 300 MG/1
600 CAPSULE ORAL 2 TIMES DAILY
Qty: 360 CAPSULE | Refills: 1 | Status: SHIPPED | OUTPATIENT
Start: 2023-12-12

## 2023-12-12 ASSESSMENT — COLUMBIA-SUICIDE SEVERITY RATING SCALE - C-SSRS
6. HAVE YOU EVER DONE ANYTHING, STARTED TO DO ANYTHING, OR PREPARED TO DO ANYTHING TO END YOUR LIFE?: NO
1. IN THE PAST MONTH, HAVE YOU WISHED YOU WERE DEAD OR WISHED YOU COULD GO TO SLEEP AND NOT WAKE UP?: NO
2. HAVE YOU ACTUALLY HAD ANY THOUGHTS OF KILLING YOURSELF?: NO

## 2023-12-12 ASSESSMENT — PATIENT HEALTH QUESTIONNAIRE - PHQ9
SUM OF ALL RESPONSES TO PHQ9 QUESTIONS 1 & 2: 0
2. FEELING DOWN, DEPRESSED OR HOPELESS: NOT AT ALL
1. LITTLE INTEREST OR PLEASURE IN DOING THINGS: NOT AT ALL

## 2023-12-12 NOTE — PROGRESS NOTES
"  72-year-old female with history of scoliosis and obesity was seen in February 2023 for left burning leg pain which had improved with gabapentin.  Underwent MRI which showed severe lumbar degenerative disc disease at multiple levels with canal stenosis and multilevel neuroforaminal stenosis.  She had stopped her gabapentin several days ago and has noticed increased pain.  She denies incontinence of urine incontinence of stool.  When she was on gabapentin 600 mg twice a day she had significantly improved but not complete pain relief. She had epidural ijection x 1 without improvement  Prior to last visit she had hypotensive episodes at home and was 95/62 at last visit. Metoprolol was weaned.          /85   Pulse 71   Temp 36.2 °C (97.2 °F)   Ht 1.626 m (5' 4\")   Wt 112 kg (248 lb)   SpO2 97%   BMI 42.57 kg/m²       Appears comfortable  No retractions  Skin without pallor petechia icterus cyanosis  Neck without JVD thyromegaly bruits  Chest wall nontender  Chest clear to auscultation without rales rhonchi wheeze  Heart regular rate and rhythm without murmur  Abdomen soft nondistended nontender without organomegaly or mass  Extremities without erythema edema Homans or cord  Peripheral pulses palpable  Neuro intact      "

## 2023-12-13 DIAGNOSIS — I10 ESSENTIAL HYPERTENSION: ICD-10-CM

## 2023-12-13 RX ORDER — TRIAMTERENE AND HYDROCHLOROTHIAZIDE 37.5; 25 MG/1; MG/1
1 CAPSULE ORAL DAILY
Qty: 90 CAPSULE | Refills: 1 | Status: SHIPPED | OUTPATIENT
Start: 2023-12-13

## 2023-12-13 RX ORDER — AMLODIPINE BESYLATE 5 MG/1
5 TABLET ORAL DAILY
Qty: 90 TABLET | Refills: 1 | Status: SHIPPED | OUTPATIENT
Start: 2023-12-13

## 2023-12-13 RX ORDER — BENAZEPRIL HYDROCHLORIDE 20 MG/1
20 TABLET ORAL DAILY
Qty: 90 TABLET | Refills: 1 | Status: SHIPPED | OUTPATIENT
Start: 2023-12-13

## 2024-04-02 ENCOUNTER — TELEPHONE (OUTPATIENT)
Dept: PRIMARY CARE | Facility: CLINIC | Age: 74
End: 2024-04-02
Payer: MEDICARE

## 2024-04-02 DIAGNOSIS — E78.5 HYPERLIPIDEMIA, UNSPECIFIED HYPERLIPIDEMIA TYPE: ICD-10-CM

## 2024-04-02 RX ORDER — ATORVASTATIN CALCIUM 20 MG/1
20 TABLET, FILM COATED ORAL DAILY
Qty: 90 TABLET | Refills: 1 | Status: SHIPPED | OUTPATIENT
Start: 2024-04-02

## 2024-04-02 NOTE — TELEPHONE ENCOUNTER
The patient had blood work done in Oct 2023 and at that time was told that the blood work would need to be repeated in 6 months. I do not see a order for the blood work in her chart. Also was questioning her Atorvastatin. She said that is was not refilled with all of her other medications and wanted to know if she should still be taking it. She is out of the Atorvastatin and it needs to be refilled.

## 2024-04-02 NOTE — TELEPHONE ENCOUNTER
She needs to take her atorvastatin daily and repeat her fasting labs in 3 months.  Her atorvastatin has been refilled and sent to [facility] pharmacy

## 2024-04-16 ENCOUNTER — OFFICE VISIT (OUTPATIENT)
Dept: ORTHOPEDIC SURGERY | Facility: CLINIC | Age: 74
End: 2024-04-16
Payer: MEDICARE

## 2024-04-16 DIAGNOSIS — M25.561 CHRONIC PAIN OF RIGHT KNEE: Primary | ICD-10-CM

## 2024-04-16 DIAGNOSIS — M48.062 SPINAL STENOSIS OF LUMBAR REGION WITH NEUROGENIC CLAUDICATION: ICD-10-CM

## 2024-04-16 DIAGNOSIS — G89.29 CHRONIC PAIN OF RIGHT KNEE: Primary | ICD-10-CM

## 2024-04-16 DIAGNOSIS — M19.049 HAND ARTHRITIS: ICD-10-CM

## 2024-04-16 PROCEDURE — 1159F MED LIST DOCD IN RCRD: CPT | Performed by: PHYSICAL MEDICINE & REHABILITATION

## 2024-04-16 PROCEDURE — 1160F RVW MEDS BY RX/DR IN RCRD: CPT | Performed by: PHYSICAL MEDICINE & REHABILITATION

## 2024-04-16 PROCEDURE — 99213 OFFICE O/P EST LOW 20 MIN: CPT | Performed by: PHYSICAL MEDICINE & REHABILITATION

## 2024-04-16 PROCEDURE — 1036F TOBACCO NON-USER: CPT | Performed by: PHYSICAL MEDICINE & REHABILITATION

## 2024-04-16 SDOH — SOCIAL STABILITY: SOCIAL NETWORK: SOCIAL ACTIVITY:: 3

## 2024-04-16 NOTE — PROGRESS NOTES
Follow Up Note    Today's Date:   4/16/2024     Assessment: Pleasant 72-year-old female with chronic right knee pain and posterior thigh pain. Also history of severe lumbar central canal stenosis and multilevel degenerative changes throughout the lumbosacral spine. Pain can be intermittently severe and impact her daily activity. Fortunately she denies any worsening neurological deficits at this time.     -Chronic myofascial pain  -Right knee osteoarthritis  -Severe lumbar stenosis and foraminal narrowing     -April 16, 2024 update: Again encouraged her to participate in physical therapy which she has not yet done.  Denies any worsening neurological deficits.  New consult was placed for physical therapy and rheumatology for her hand arthritis.  She will follow-up if she has any worsening pain or symptoms or fails to improve with conservative treatment.     PLAN:  1)  Imaging/Diagnostic Studies: X-ray right knee 8- shows mild tricompartmental osteoarthritis.  MRI lumbar from 04/2023 reviewed at last visit.   2)  Therapy/Rehabilitation: Still recommend PT, referral in place from last visit.  3)  Pharmacological Management: Start Robaxin PRN.  Discuss increasing gabapentin with original prescriber.  4)  Spine/Surgical Interventions: Consider repeat lumbar KAUSHAL if not improved.  5)  Alternative Treatments: May consider alternative treatment options in the future including manipulation (chiropractor versus osteopathic) and/or acupuncture if patient does not obtain optimal relief with initial treatment plan.  6)  Consultations: PT  7)  Follow -up: as needed  8)  Future treatment considerations: Consider lower extremity EMG if not improving.  Consider right knee corticosteroid injection.    Patient advised of the difference between hurt and harm and advised to continue with all normal activities and exercises. Patient verbalized understanding of the above plan and was happy with the care provided.      The above  "clinical summary has been dictated with voice recognition software. It has not been proofread for grammatical errors, typographical mistakes, or other semantic inconsistencies.    Thank you for visiting our office today. It was our pleasure to take part in your healthcare.     Do not hesitate to call with any questions regarding your plan of care after leaving at [phone]    To clinicians, thank you very much for this kind referral. It is a privilege to partner with you in the care of your patients. My office would be delighted to assist you with any further consultations or with questions regarding the plan of care outlined. Do not hesitate to call the office or contact me directly.     Sincerely,    BRENDA Blue MD  , Physical Medicine and Rehabilitation, Orthopedic Spine  [facility] [employer] [facility]  [facility] [place]        Paty Maldonado \"Rika"  is a 73 y.o. female who was last seen October 16, 2023.  Since the last visit the pain is better at the knee but continues to have lower back pain with gardening.  Right knee is the most bothersome complaint today.      -Has not yet started PT  -No longer taking her muscle relaxer.        TREATMENTS  IN THE LAST SIX MONTHS     Active conservative therapy  in the last six months (see below)              1. Physical therapy:                                                                                     2. Home exercise program after PT:                                                      3. A physician supervised home exercise program (HEP):                 4. Chiropractic Care:                                                                      Passive conservative therapy  in the last six months (see below)              1. NSAIDS:                                                                                                           2. Prescription pain medication:                         "                                      3. Acupuncture:                                                                                             4. Tens unit:      Patient denies bowel/bladder incontinence, denies fever, denies unintentional weight loss, denies clumsiness of hands, feet, or dropping things.  Denies any constitutional or myelopathic symptomatology.     ROS: Other than listed in HPI, PMHX below, the patient denies any complaint of the following: severe cough, fainting, vision or language changes, shortness of breath, chest pain, nausea, vomiting or diarrhea, rash, dysuria, seizures, excessive sweating, or bleeding problems.    I have confirmed and edited as necessary Past Medical, Past Surgical, Family, Social History and ROS as obtained by others. No new sig. changes since last visit.       PHYSICAL EXAM:   GENERAL APPEARANCE:  Well nourished, well developed, and no apparent distress.  NEURO PSYCH: Patient oriented to person, place, Mood pleasant. Benign affect.  MUSCULOSKELETAL and NEUROLOGICAL   Gait steady with use of cane    DATA REVIEW:   The below imaging studies were personally reviewed and discussed with the patient.    Medical Decision Making:  The above note constitutes a Moderate to High level of medical decision making based on past data and imaging review, new and chronic symptoms with exacerbation, change in weakness or sensation, new imaging and diagnostic studies ordered, discussion of potential interventional or surgical treatment options, acute or chronic pain that may pose a threat to bodily function.    Current Outpatient Medications on File Prior to Visit   Medication Sig Dispense Refill    oxybutynin XL (Ditropan-XL) 5 mg 24 hr tablet TAKE 1 TABLET ONE TIME DAILY 90 tablet 0    triamterene-hydrochlorothiazid (Dyazide) 37.5-25 mg capsule Take 1 capsule by mouth once daily. 90 capsule 1    amLODIPine (Norvasc) 5 mg tablet Take 1 tablet (5 mg) by mouth once daily. 90 tablet 1     atorvastatin (Lipitor) 20 mg tablet Take 1 tablet (20 mg) by mouth once daily. 90 tablet 1    benazepril (Lotensin) 20 mg tablet Take 1 tablet (20 mg) by mouth once daily. 90 tablet 1    gabapentin (Neurontin) 300 mg capsule Take 2 capsules (600 mg) by mouth 2 times a day. 360 capsule 1    methocarbamol (Robaxin) 500 mg tablet 1-2 tabs every 8 hrs as needed for muscle spasm 60 tablet 2     No current facility-administered medications on file prior to visit.        History reviewed. No pertinent past medical history.     History reviewed. No pertinent surgical history.     No Known Allergies

## 2024-06-13 ENCOUNTER — LAB (OUTPATIENT)
Dept: LAB | Facility: LAB | Age: 74
End: 2024-06-13
Payer: MEDICARE

## 2024-06-13 DIAGNOSIS — E78.5 HYPERLIPIDEMIA, UNSPECIFIED HYPERLIPIDEMIA TYPE: ICD-10-CM

## 2024-06-13 LAB
ALT SERPL W P-5'-P-CCNC: 10 U/L (ref 7–45)
AST SERPL W P-5'-P-CCNC: 14 U/L (ref 9–39)
CHOLEST SERPL-MCNC: 155 MG/DL (ref 0–199)
CHOLESTEROL/HDL RATIO: 3.9
HDLC SERPL-MCNC: 40.1 MG/DL
LDLC SERPL CALC-MCNC: 92 MG/DL
NON HDL CHOLESTEROL: 115 MG/DL (ref 0–149)
TRIGL SERPL-MCNC: 114 MG/DL (ref 0–149)
VLDL: 23 MG/DL (ref 0–40)

## 2024-06-13 PROCEDURE — 36415 COLL VENOUS BLD VENIPUNCTURE: CPT

## 2024-06-13 PROCEDURE — 84460 ALANINE AMINO (ALT) (SGPT): CPT

## 2024-06-13 PROCEDURE — 80061 LIPID PANEL: CPT

## 2024-06-13 PROCEDURE — 84450 TRANSFERASE (AST) (SGOT): CPT

## 2024-06-21 ENCOUNTER — APPOINTMENT (OUTPATIENT)
Dept: PRIMARY CARE | Facility: CLINIC | Age: 74
End: 2024-06-21
Payer: MEDICARE

## 2024-06-21 VITALS
BODY MASS INDEX: 42.51 KG/M2 | WEIGHT: 249 LBS | OXYGEN SATURATION: 94 % | SYSTOLIC BLOOD PRESSURE: 124 MMHG | TEMPERATURE: 97.2 F | HEART RATE: 69 BPM | HEIGHT: 64 IN | DIASTOLIC BLOOD PRESSURE: 80 MMHG

## 2024-06-21 DIAGNOSIS — Z12.12 SCREENING FOR COLORECTAL CANCER: ICD-10-CM

## 2024-06-21 DIAGNOSIS — Z78.0 ASYMPTOMATIC MENOPAUSAL STATE: ICD-10-CM

## 2024-06-21 DIAGNOSIS — Z12.11 SCREENING FOR COLORECTAL CANCER: ICD-10-CM

## 2024-06-21 DIAGNOSIS — I10 ESSENTIAL HYPERTENSION: ICD-10-CM

## 2024-06-21 DIAGNOSIS — M54.16 LUMBAR RADICULOPATHY: ICD-10-CM

## 2024-06-21 DIAGNOSIS — R32 URINARY INCONTINENCE, UNSPECIFIED TYPE: ICD-10-CM

## 2024-06-21 DIAGNOSIS — Z00.00 ROUTINE GENERAL MEDICAL EXAMINATION AT HEALTH CARE FACILITY: Primary | ICD-10-CM

## 2024-06-21 DIAGNOSIS — Z12.31 ENCOUNTER FOR SCREENING MAMMOGRAM FOR BREAST CANCER: ICD-10-CM

## 2024-06-21 DIAGNOSIS — E78.5 HYPERLIPIDEMIA, UNSPECIFIED HYPERLIPIDEMIA TYPE: ICD-10-CM

## 2024-06-21 PROCEDURE — 3074F SYST BP LT 130 MM HG: CPT | Performed by: FAMILY MEDICINE

## 2024-06-21 PROCEDURE — G0439 PPPS, SUBSEQ VISIT: HCPCS | Performed by: FAMILY MEDICINE

## 2024-06-21 PROCEDURE — 3079F DIAST BP 80-89 MM HG: CPT | Performed by: FAMILY MEDICINE

## 2024-06-21 PROCEDURE — 1160F RVW MEDS BY RX/DR IN RCRD: CPT | Performed by: FAMILY MEDICINE

## 2024-06-21 PROCEDURE — 1159F MED LIST DOCD IN RCRD: CPT | Performed by: FAMILY MEDICINE

## 2024-06-21 PROCEDURE — G0444 DEPRESSION SCREEN ANNUAL: HCPCS | Performed by: FAMILY MEDICINE

## 2024-06-21 PROCEDURE — 99214 OFFICE O/P EST MOD 30 MIN: CPT | Performed by: FAMILY MEDICINE

## 2024-06-21 PROCEDURE — 1036F TOBACCO NON-USER: CPT | Performed by: FAMILY MEDICINE

## 2024-06-21 RX ORDER — AMLODIPINE BESYLATE 5 MG/1
5 TABLET ORAL DAILY
Qty: 90 TABLET | Refills: 1 | Status: SHIPPED | OUTPATIENT
Start: 2024-06-21

## 2024-06-21 RX ORDER — TRIAMTERENE AND HYDROCHLOROTHIAZIDE 37.5; 25 MG/1; MG/1
1 CAPSULE ORAL DAILY
Qty: 90 CAPSULE | Refills: 1 | Status: SHIPPED | OUTPATIENT
Start: 2024-06-21

## 2024-06-21 RX ORDER — GABAPENTIN 300 MG/1
600 CAPSULE ORAL 2 TIMES DAILY
Qty: 360 CAPSULE | Refills: 1 | Status: SHIPPED | OUTPATIENT
Start: 2024-06-21

## 2024-06-21 RX ORDER — ATORVASTATIN CALCIUM 20 MG/1
20 TABLET, FILM COATED ORAL DAILY
Qty: 90 TABLET | Refills: 1 | Status: SHIPPED | OUTPATIENT
Start: 2024-06-21

## 2024-06-21 RX ORDER — BENAZEPRIL HYDROCHLORIDE 20 MG/1
20 TABLET ORAL DAILY
Qty: 90 TABLET | Refills: 1 | Status: SHIPPED | OUTPATIENT
Start: 2024-06-21

## 2024-06-21 RX ORDER — OXYBUTYNIN CHLORIDE 5 MG/1
5 TABLET, EXTENDED RELEASE ORAL DAILY
Qty: 90 TABLET | Refills: 0 | Status: SHIPPED | OUTPATIENT
Start: 2024-06-21

## 2024-06-21 ASSESSMENT — PATIENT HEALTH QUESTIONNAIRE - PHQ9
1. LITTLE INTEREST OR PLEASURE IN DOING THINGS: NOT AT ALL
2. FEELING DOWN, DEPRESSED OR HOPELESS: NOT AT ALL
SUM OF ALL RESPONSES TO PHQ9 QUESTIONS 1 & 2: 0

## 2024-06-21 NOTE — PROGRESS NOTES
"Subjective   Reason for Visit: Paty Maldonado is an 73 y.o. female here for a Medicare Wellness visit.     Past Medical, Surgical, and Family History reviewed and updated in chart.    Reviewed all medications by prescribing practitioner or clinical pharmacist (such as prescriptions, OTCs, herbal therapies and supplements) and documented in the medical record.    HPI    Patient Care Team:  Richardson Givens MD as PCP - General (Family Medicine)  Richardson Givens MD as PCP [facility] [place] [place] [facility]     Review of Systems        denies chest pain SOB WILKERSON diaphoresis nausea palpitations syncope presyncope orthopnea edema leg pain dysarthria aphasia focal weakness headache numbness tingling  visual disturbance gait disturbance polydipsia polyuria weight loss tremor epistaxis melena rectal bleeding fatigue weight change temperature intolerance.      6 minutes spent with with patient discussing depression screening.  PHQ 2 is negative    Objective   Vitals:  /80   Pulse 69   Temp 36.2 °C (97.2 °F)   Ht 1.626 m (5' 4\")   Wt 113 kg (249 lb)   SpO2 94%   BMI 42.74 kg/m²       Physical Exam      Appears comfortable  No retractions  Skin without pallor petechia icterus cyanosis  Neck without JVD thyromegaly bruits  Chest clear to auscultation without rales rhonchi wheeze  Heart regular rate and rhythm without murmur  Abdomen soft nondistended nontender without organomegaly or mass  Extremities without erythema edema Homans or cord  Peripheral pulses palpable  Neuro intact      Assessment/Plan   Problem List Items Addressed This Visit    None  Visit Diagnoses       Routine general medical examination at health care facility    -  Primary    Asymptomatic menopausal state        Relevant Orders    XR DEXA bone density    Encounter for screening mammogram for breast cancer        Relevant Orders    BI mammo bilateral screening tomosynthesis    Screening for colorectal cancer        Relevant Orders    Cologuard® " colon cancer screening

## 2024-06-24 ASSESSMENT — LIFESTYLE VARIABLES
3_OR_MORE_DRINKS_PER_DAY: N
CURRENT_SMOKER: N

## 2024-06-28 ENCOUNTER — APPOINTMENT (OUTPATIENT)
Dept: RADIOLOGY | Facility: CLINIC | Age: 74
End: 2024-06-28
Payer: MEDICARE

## 2024-07-14 ASSESSMENT — LIFESTYLE VARIABLES
3_OR_MORE_DRINKS_PER_DAY: N
CURRENT_SMOKER: N

## 2024-07-16 ENCOUNTER — APPOINTMENT (OUTPATIENT)
Dept: RADIOLOGY | Facility: CLINIC | Age: 74
End: 2024-07-16
Payer: MEDICARE

## 2024-07-16 ENCOUNTER — APPOINTMENT (OUTPATIENT)
Dept: ORTHOPEDIC SURGERY | Facility: CLINIC | Age: 74
End: 2024-07-16
Payer: MEDICARE

## 2024-08-20 ENCOUNTER — APPOINTMENT (OUTPATIENT)
Dept: ORTHOPEDIC SURGERY | Facility: CLINIC | Age: 74
End: 2024-08-20
Payer: MEDICARE

## 2024-08-20 DIAGNOSIS — M79.18 CHRONIC MYOFASCIAL PAIN: ICD-10-CM

## 2024-08-20 DIAGNOSIS — G89.29 CHRONIC MYOFASCIAL PAIN: ICD-10-CM

## 2024-08-20 DIAGNOSIS — M54.16 LUMBAR RADICULITIS: ICD-10-CM

## 2024-08-20 DIAGNOSIS — M48.062 SPINAL STENOSIS OF LUMBAR REGION WITH NEUROGENIC CLAUDICATION: Primary | ICD-10-CM

## 2024-08-20 DIAGNOSIS — G89.29 CHRONIC PAIN OF RIGHT KNEE: ICD-10-CM

## 2024-08-20 DIAGNOSIS — M79.605 LEFT LEG PAIN: ICD-10-CM

## 2024-08-20 DIAGNOSIS — M25.561 CHRONIC PAIN OF RIGHT KNEE: ICD-10-CM

## 2024-08-20 PROCEDURE — 1160F RVW MEDS BY RX/DR IN RCRD: CPT | Performed by: PHYSICAL MEDICINE & REHABILITATION

## 2024-08-20 PROCEDURE — 99214 OFFICE O/P EST MOD 30 MIN: CPT | Performed by: PHYSICAL MEDICINE & REHABILITATION

## 2024-08-20 PROCEDURE — 1036F TOBACCO NON-USER: CPT | Performed by: PHYSICAL MEDICINE & REHABILITATION

## 2024-08-20 PROCEDURE — 1159F MED LIST DOCD IN RCRD: CPT | Performed by: PHYSICAL MEDICINE & REHABILITATION

## 2024-08-20 RX ORDER — DULOXETIN HYDROCHLORIDE 30 MG/1
30 CAPSULE, DELAYED RELEASE ORAL DAILY
Qty: 30 CAPSULE | Refills: 1 | Status: SHIPPED | OUTPATIENT
Start: 2024-08-20 | End: 2024-09-19

## 2024-08-20 SDOH — SOCIAL STABILITY: SOCIAL NETWORK: SOCIAL ACTIVITY:: 5

## 2024-08-20 NOTE — PROGRESS NOTES
Follow Up Note    Today's Date:   8/20/2024     Assessment: Pleasant 73-year-old female with chronic right knee pain and posterior thigh pain. Also history of severe lumbar central canal stenosis and multilevel degenerative changes throughout the lumbosacral spine. Pain can be intermittently severe and impact her daily activity. Fortunately she denies any worsening neurological deficits at this time.     -Chronic myofascial pain  -Right knee osteoarthritis  -Severe lumbar stenosis and foraminal narrowing     -8/20/2024 update: Pain is now predominantly rating down the left leg to the lateral calf.  She denies any worsening neurological deficits.  Continues to have some chronic right knee pain as well.  Interested in resuming physical therapy which was provided today.  She is also interested in starting Cymbalta 30 mg daily-new Rx was placed with instructions to titrate slowly up to 60 mg daily after a week.  Deferred injections at this time as she has actually been feeling better in the past few weeks.  She will follow-up if her pain persists or worsens we can consider repeat KAUSHAL.  Again discussed that her pathology may require surgery in the future but she is trying to avoid if at all possible.  At least 20 minutes spent face-to-face time discussing her plan as above and below     PLAN:  1)  Imaging/Diagnostic Studies: X-ray right knee 8- shows mild tricompartmental osteoarthritis.  MRI lumbar from 04/2023 reviewed at last visit.   2)  Therapy/Rehabilitation: Still recommend PT  3)  Pharmacological Management: Start Robaxin PRN.  Discuss increasing gabapentin with original prescriber.  4)  Spine/Surgical Interventions: Consider repeat lumbar KAUSHAL if not improved.  5)  Alternative Treatments: May consider alternative treatment options in the future including manipulation (chiropractor versus osteopathic) and/or acupuncture if patient does not obtain optimal relief with initial treatment plan.  6)   "Consultations: PT  7)  Follow -up: as needed  8)  Future treatment considerations: Consider lower extremity EMG if not improving.  Consider right knee corticosteroid injection.    Patient advised of the difference between hurt and harm and advised to continue with all normal activities and exercises. Patient verbalized understanding of the above plan and was happy with the care provided.      The above clinical summary has been dictated with voice recognition software. It has not been proofread for grammatical errors, typographical mistakes, or other semantic inconsistencies.    Thank you for visiting our office today. It was our pleasure to take part in your healthcare.     Do not hesitate to call with any questions regarding your plan of care after leaving at [phone]    To clinicians, thank you very much for this kind referral. It is a privilege to partner with you in the care of your patients. My office would be delighted to assist you with any further consultations or with questions regarding the plan of care outlined. Do not hesitate to call the office or contact me directly.     Sincerely,    BRENDA Blue MD  , Physical Medicine and Rehabilitation, Orthopedic Spine  [facility] [employer] [facility]  [facility] [place]        Paty Maldonado \"Raquel\"  is a 73 y.o. female who was last seen April 16, 2024.  Since that time her pain can be a 10/10 very intermittently.  Pain in the left leg, worse with forward flexion, achy, better at rest.  Intermittent numbness and tingling. No worsening neurological deficits.   -Has completed 3 sessions of PT but stopped due to pain  -No longer taking her muscle relaxer.   -Gabapentin:  600 mg BID       TREATMENTS  IN THE LAST SIX MONTHS     Active conservative therapy  in the last six months (see below)              1. Physical therapy:   Yes                                                                         "          2. Home exercise program after PT: Yes                                                    3. A physician supervised home exercise program (HEP): Yes              4. Chiropractic Care:                                                                      Passive conservative therapy  in the last six months (see below)              1. NSAIDS:                                                                                                           2. Prescription pain medication: Gabapentin                                                            3. Acupuncture:                                                                                             4. Tens unit:      Patient denies bowel/bladder incontinence, denies fever, denies unintentional weight loss, denies clumsiness of hands, feet, or dropping things.  Denies any constitutional or myelopathic symptomatology.     ROS: Other than listed in HPI, PMHX below, the patient denies any complaint of the following: severe cough, fainting, vision or language changes, shortness of breath, chest pain, nausea, vomiting or diarrhea, rash, dysuria, seizures, excessive sweating, or bleeding problems.    I have confirmed and edited as necessary Past Medical, Past Surgical, Family, Social History and ROS as obtained by others. No new sig. changes since last visit.       PHYSICAL EXAM:   GENERAL APPEARANCE:  Well nourished, well developed, and no apparent distress.  NEURO PSYCH: Patient oriented to person, place, Mood pleasant. Benign affect.  MUSCULOSKELETAL and NEUROLOGICAL   Gait steady with use of cane-unchanged  Lumbar range of motion pain at endrange flexion  MONICA negative bilateral  Slump testing negative bilateral  Strength functionally full in the bilateral lower extremities    DATA REVIEW:   The below imaging studies were personally reviewed and discussed with the patient.    Medical Decision Making:  The above note constitutes a Moderate to High level of  medical decision making based on past data and imaging review, new and chronic symptoms with exacerbation, change in weakness or sensation, new imaging and diagnostic studies ordered, discussion of potential interventional or surgical treatment options, acute or chronic pain that may pose a threat to bodily function.    Current Outpatient Medications on File Prior to Visit   Medication Sig Dispense Refill    amLODIPine (Norvasc) 5 mg tablet Take 1 tablet (5 mg) by mouth once daily. 90 tablet 1    atorvastatin (Lipitor) 20 mg tablet Take 1 tablet (20 mg) by mouth once daily. 90 tablet 1    benazepril (Lotensin) 20 mg tablet Take 1 tablet (20 mg) by mouth once daily. 90 tablet 1    gabapentin (Neurontin) 300 mg capsule Take 2 capsules (600 mg) by mouth 2 times a day. 360 capsule 1    oxybutynin XL (Ditropan-XL) 5 mg 24 hr tablet Take 1 tablet (5 mg) by mouth once daily. 90 tablet 0    triamterene-hydrochlorothiazid (Dyazide) 37.5-25 mg capsule Take 1 capsule by mouth once daily. 90 capsule 1     No current facility-administered medications on file prior to visit.        No past medical history on file.     No past surgical history on file.     No Known Allergies

## 2024-09-17 ENCOUNTER — APPOINTMENT (OUTPATIENT)
Dept: RHEUMATOLOGY | Facility: CLINIC | Age: 74
End: 2024-09-17
Payer: MEDICARE

## 2024-10-10 ENCOUNTER — TELEPHONE (OUTPATIENT)
Dept: PRIMARY CARE | Facility: CLINIC | Age: 74
End: 2024-10-10
Payer: MEDICARE

## 2024-10-10 DIAGNOSIS — R32 URINARY INCONTINENCE, UNSPECIFIED TYPE: ICD-10-CM

## 2024-10-11 RX ORDER — OXYBUTYNIN CHLORIDE 5 MG/1
5 TABLET, EXTENDED RELEASE ORAL DAILY
Qty: 90 TABLET | Refills: 0 | Status: SHIPPED | OUTPATIENT
Start: 2024-10-11

## 2024-11-18 DIAGNOSIS — I10 ESSENTIAL HYPERTENSION: Primary | ICD-10-CM

## 2024-12-06 ENCOUNTER — LAB (OUTPATIENT)
Dept: LAB | Facility: LAB | Age: 74
End: 2024-12-06
Payer: MEDICARE

## 2024-12-06 DIAGNOSIS — I10 ESSENTIAL HYPERTENSION: ICD-10-CM

## 2024-12-06 PROCEDURE — 36415 COLL VENOUS BLD VENIPUNCTURE: CPT

## 2024-12-06 PROCEDURE — 80048 BASIC METABOLIC PNL TOTAL CA: CPT

## 2024-12-07 LAB
ANION GAP SERPL CALC-SCNC: 12 MMOL/L (ref 10–20)
BUN SERPL-MCNC: 42 MG/DL (ref 6–23)
CALCIUM SERPL-MCNC: 9.9 MG/DL (ref 8.6–10.6)
CHLORIDE SERPL-SCNC: 105 MMOL/L (ref 98–107)
CO2 SERPL-SCNC: 28 MMOL/L (ref 21–32)
CREAT SERPL-MCNC: 1.42 MG/DL (ref 0.5–1.05)
EGFRCR SERPLBLD CKD-EPI 2021: 39 ML/MIN/1.73M*2
GLUCOSE SERPL-MCNC: 98 MG/DL (ref 74–99)
POTASSIUM SERPL-SCNC: 4.4 MMOL/L (ref 3.5–5.3)
SODIUM SERPL-SCNC: 141 MMOL/L (ref 136–145)

## 2024-12-11 ENCOUNTER — APPOINTMENT (OUTPATIENT)
Dept: PRIMARY CARE | Facility: CLINIC | Age: 74
End: 2024-12-11
Payer: MEDICARE

## 2024-12-11 VITALS
HEART RATE: 73 BPM | SYSTOLIC BLOOD PRESSURE: 111 MMHG | OXYGEN SATURATION: 95 % | TEMPERATURE: 97.3 F | WEIGHT: 247 LBS | BODY MASS INDEX: 42.17 KG/M2 | HEIGHT: 64 IN | DIASTOLIC BLOOD PRESSURE: 75 MMHG

## 2024-12-11 DIAGNOSIS — N18.32 STAGE 3B CHRONIC KIDNEY DISEASE (MULTI): ICD-10-CM

## 2024-12-11 DIAGNOSIS — R32 URINARY INCONTINENCE, UNSPECIFIED TYPE: ICD-10-CM

## 2024-12-11 DIAGNOSIS — E78.5 HYPERLIPIDEMIA, UNSPECIFIED HYPERLIPIDEMIA TYPE: ICD-10-CM

## 2024-12-11 DIAGNOSIS — I10 ESSENTIAL HYPERTENSION: ICD-10-CM

## 2024-12-11 DIAGNOSIS — M54.16 LUMBAR RADICULOPATHY: ICD-10-CM

## 2024-12-11 PROBLEM — H35.30 MACULAR DEGENERATION: Status: ACTIVE | Noted: 2024-12-11

## 2024-12-11 PROCEDURE — 1159F MED LIST DOCD IN RCRD: CPT | Performed by: FAMILY MEDICINE

## 2024-12-11 PROCEDURE — 3008F BODY MASS INDEX DOCD: CPT | Performed by: FAMILY MEDICINE

## 2024-12-11 PROCEDURE — 1123F ACP DISCUSS/DSCN MKR DOCD: CPT | Performed by: FAMILY MEDICINE

## 2024-12-11 PROCEDURE — 1036F TOBACCO NON-USER: CPT | Performed by: FAMILY MEDICINE

## 2024-12-11 PROCEDURE — 1160F RVW MEDS BY RX/DR IN RCRD: CPT | Performed by: FAMILY MEDICINE

## 2024-12-11 PROCEDURE — G2211 COMPLEX E/M VISIT ADD ON: HCPCS | Performed by: FAMILY MEDICINE

## 2024-12-11 PROCEDURE — 3074F SYST BP LT 130 MM HG: CPT | Performed by: FAMILY MEDICINE

## 2024-12-11 PROCEDURE — 99214 OFFICE O/P EST MOD 30 MIN: CPT | Performed by: FAMILY MEDICINE

## 2024-12-11 PROCEDURE — 1158F ADVNC CARE PLAN TLK DOCD: CPT | Performed by: FAMILY MEDICINE

## 2024-12-11 PROCEDURE — 3078F DIAST BP <80 MM HG: CPT | Performed by: FAMILY MEDICINE

## 2024-12-11 RX ORDER — BENAZEPRIL HYDROCHLORIDE 20 MG/1
20 TABLET ORAL DAILY
Qty: 90 TABLET | Refills: 1 | Status: SHIPPED | OUTPATIENT
Start: 2024-12-11

## 2024-12-11 RX ORDER — GABAPENTIN 300 MG/1
600 CAPSULE ORAL 2 TIMES DAILY
Qty: 360 CAPSULE | Refills: 1 | Status: SHIPPED | OUTPATIENT
Start: 2024-12-11

## 2024-12-11 RX ORDER — OXYBUTYNIN CHLORIDE 5 MG/1
5 TABLET, EXTENDED RELEASE ORAL DAILY
Qty: 90 TABLET | Refills: 1 | Status: SHIPPED | OUTPATIENT
Start: 2024-12-11

## 2024-12-11 RX ORDER — AMLODIPINE BESYLATE 5 MG/1
5 TABLET ORAL DAILY
Qty: 90 TABLET | Refills: 1 | Status: SHIPPED | OUTPATIENT
Start: 2024-12-11

## 2024-12-11 RX ORDER — ATORVASTATIN CALCIUM 20 MG/1
20 TABLET, FILM COATED ORAL DAILY
Qty: 90 TABLET | Refills: 1 | Status: SHIPPED | OUTPATIENT
Start: 2024-12-11

## 2024-12-11 RX ORDER — TRIAMTERENE AND HYDROCHLOROTHIAZIDE 37.5; 25 MG/1; MG/1
1 CAPSULE ORAL DAILY
Qty: 90 CAPSULE | Refills: 1 | Status: SHIPPED | OUTPATIENT
Start: 2024-12-11

## 2024-12-11 ASSESSMENT — PROMIS GLOBAL HEALTH SCALE
CARRYOUT_SOCIAL_ACTIVITIES: GOOD
RATE_PHYSICAL_HEALTH: FAIR
RATE_SOCIAL_SATISFACTION: FAIR
RATE_GENERAL_HEALTH: FAIR
RATE_MENTAL_HEALTH: FAIR
RATE_QUALITY_OF_LIFE: FAIR
EMOTIONAL_PROBLEMS: RARELY
RATE_AVERAGE_FATIGUE: MILD
RATE_AVERAGE_PAIN: 5
CARRYOUT_PHYSICAL_ACTIVITIES: MODERATELY

## 2024-12-11 NOTE — PROGRESS NOTES
"Here for fu visit re: HTN , HLD     compliant with meds     tolerating meds     Not monitoring BP at home        denies chest pain SOB WILKERSON diaphoresis nausea palpitations syncope presyncope orthopnea edema leg pain dysarthria aphasia focal weakness headache numbness tingling  visual disturbance gait disturbance polydipsia polyuria weight loss tremor epistaxis melena rectal bleeding fatigue weight change temperature intolerance.        /75   Pulse 73   Temp 36.3 °C (97.3 °F)   Ht 1.626 m (5' 4\")   Wt 112 kg (247 lb)   SpO2 95%   BMI 42.40 kg/m²       Appears comfortable  No retractions  Skin without pallor petechia icterus cyanosis  Neck without JVD thyromegaly bruits  Chest clear to auscultation without rales rhonchi wheeze  Heart regular rate and rhythm without murmur  Abdomen soft nondistended nontender without organomegaly or mass  Extremities without erythema edema Homans or cord  Peripheral pulses palpable  Neuro intact      "

## 2024-12-20 ENCOUNTER — APPOINTMENT (OUTPATIENT)
Dept: PRIMARY CARE | Facility: CLINIC | Age: 74
End: 2024-12-20
Payer: MEDICARE

## 2025-01-07 ENCOUNTER — APPOINTMENT (OUTPATIENT)
Dept: RHEUMATOLOGY | Facility: CLINIC | Age: 75
End: 2025-01-07
Payer: MEDICARE

## 2025-03-04 ENCOUNTER — APPOINTMENT (OUTPATIENT)
Dept: RHEUMATOLOGY | Facility: CLINIC | Age: 75
End: 2025-03-04
Payer: MEDICARE

## 2025-05-08 ENCOUNTER — TELEMEDICINE (OUTPATIENT)
Dept: PRIMARY CARE | Facility: CLINIC | Age: 75
End: 2025-05-08
Payer: MEDICARE

## 2025-05-08 DIAGNOSIS — R05.1 ACUTE COUGH: Primary | ICD-10-CM

## 2025-05-08 PROCEDURE — 1160F RVW MEDS BY RX/DR IN RCRD: CPT | Performed by: FAMILY MEDICINE

## 2025-05-08 PROCEDURE — 99214 OFFICE O/P EST MOD 30 MIN: CPT | Performed by: FAMILY MEDICINE

## 2025-05-08 PROCEDURE — 1159F MED LIST DOCD IN RCRD: CPT | Performed by: FAMILY MEDICINE

## 2025-05-08 PROCEDURE — 1123F ACP DISCUSS/DSCN MKR DOCD: CPT | Performed by: FAMILY MEDICINE

## 2025-05-08 RX ORDER — BENZONATATE 200 MG/1
200 CAPSULE ORAL 3 TIMES DAILY PRN
Qty: 21 CAPSULE | Refills: 0 | Status: SHIPPED | OUTPATIENT
Start: 2025-05-08 | End: 2025-05-15

## 2025-05-08 NOTE — PROGRESS NOTES
I performed this visit using real-time telehealth tools, including an audio/video OR telephone connection between the patient listed who was located in the [facility] and myself, Linda Sánchez (Board certified in the [facility]).At the start of the visit, I introduced myself as Dr. Lopez and verified the patients name, , and current physical location.  If they were currently outside of the state of [place], the visit was ended and the patient was referred to alternative means for evaluation and treatment.  The patient was made aware of the limitations of the telehealth visit.  They will not be physically examined and all issues may not be appropriate for a telehealth visit.  If necessary, an in-    In preparing for this visit and writing this note, I reviewed previous electronic medical records (labs, imaging and medical charts) of the patient available in the physician portal. Significant findings which helped in decision making are recorded in this encounter charting.  All allergies were reviewed with the patient and all medications reconciled verbally with the patient at the beginning oft the visit.    Chief complaint:     Sxs started-- with a cough here and there-  2 days ago got worse cough (today is Thursday)  Yesterday laryngitis started  No fever, chills or aches  Cough is not productive  Still doing things  Appetite less- not sure why--nothing appeals to her--had a big bowl of homemade chicken soup and fruit-  Yesterday rested--  Cough not interrupting her sleep-gabapentin helps her sleep--does not cough at night  When talking coughs more  No tickle in back of throat  Yesterday ST on 1 side--better today  Does not talk much--lives alone-- talks on phone  No SOB, wheezing, chest tightness, chest pain, or chest heaviness  NEVER SMOKER--  OTC meds-- mucinex- took x 1    All other ROS (-)    General appearance:  Vitals available from patient? no  Alert, oriented, pleasant, in no apparent  distress? yes  Answers questions appropriately? yes  Eyes clear? yes  Is patient in respiratory distress? no  Throat exam: not available  Is patient coughing during consult: YES  Audible wheezing noted? : no  Pt sounds congested?:  NO  Sniffing or rhinorrhea?:  NO  Psychiatric: Affect normal? yes  Other relevant physical exam:    Assessment and Plan:  Viral cough  No red flag symptoms  Trial of DM, honey, cough drops, benzonatate as needed  Discussed viral cough--can last several weeks   Watch for SOB, wheezing, chest tightness, chest pain, or chest heaviness and follow up if you develop these      Discussed with patient during visit  differential diagnosis; viral versus bacterial infection;  (if relevant); use of medications prescribed and possible side effects; appropriate over-the-counter medications; possible complications ; when to follow-up for in person evaluation.  All patient's questions were answered. Patient has good decision making capacity.  They are alert to person, place, time and situation.  Patient has the ability to communicate choice, understand information, consequences, and reason rationally.  If sent for in person care at  or ED,    I explained why Urgent in person exam was necessary and that failure to have further evaluation, and treatment today may lead to, negative outcomes, permanent disability, or even death.   If not sent for in person care today,   follow-up with your PCP in 2-3 days, sooner if not  improving.    Follow-up immediately if symptoms worsen.   If experiencing symptoms including but not limited to lethargy / chest pain / weakness / dizziness / difficulty breathing please call 911 or go to the closest emergency department for immediate care.   Limitations to telemedicine include inability to do a complete and accurate physical exam.    Any concerns regarding this were conveyed with the patient and in person follow-up recommended if the nature of their concern/illness does  not progress as anticipated during this visit.

## 2025-05-08 NOTE — PATIENT INSTRUCTIONS
"Viral cough  No red flag symptoms  Trial of DM, honey, cough drops, benzonatate as needed  Discussed viral cough--can last several weeks   Watch for SOB, wheezing, chest tightness, chest pain, or chest heaviness and follow up if you develop these    Please send me a [employer] message if you have any questions or concerns.  FOR NON URGENT questions only.  Allow up to 72 hours for response.    If you have prescription issues or other questions you can email   Kristina Melendez  Digital Health Coordinator, at   [email or web address]     To connect with a new PCP please visit [email or web address]   or call [phone]    Rest and drink plenty of fluids    Tylenol and or motrin as needed for pain and fever (unless you have been told not to take these because of your personal medical history)    Discussed options and precautions (complaint specific and may include)  Viral versus bacterial infection; use of medications; possible side effects; appropriate over-the-counter medications; possible complications and /or when to follow-up.    Limitations to telemedicine include inability to do a complete and accurate physical exam.  Any concerns regarding this were conveyed with the patient and in person follow-up recommended if patient nature of illness does not progress as anticipated during this visit.  Red flags requiring in person care were discussed.     if sent for in person care at  or ED,  it was explained why and failure to have \"higher level of care\" further evaluation, and treatment today may lead, but is not limited to negative outcomes, permanent disability, or even death.     If not sent for in person care today, follow-up with your PCP in 2-3 days, sooner if not  improving.    Follow-up immediately if symptoms worsen. If experiencing symptoms including but not limited to lethargy / chest pain / weakness / dizziness / difficulty breathing please call 911 or go to the " emergency department for immediate care.     All patient's questions were answered. Patient has good decision making capacity.  They are alert to person, place, time and situation.  Patient has the ability to communicate choice, understand information, consequences, and reason rationally.    FOR THOSE WITH SYMPTOMS OF ILLNESS/UPPER RESPIRATORY INFECTION/COVID    CDC updated Respiratory Infection guidelines:   When you have a respiratory virus, stay home and away from others (including people  you live with who are not sick) Symptoms can include fever, chills, fatigue, cough,  runny nose, and headache (and others).    You may return to work when the following 3 conditions are met:    1) No fever without the use of a fever reducer for 24 hours  2 symptoms are overall improving AND  3) symptoms are mild     When you go back to your normal activities, take added precaution over the next 5 days, such as taking additional steps for  air, hygiene, masks, physical distancing, and/or testing when you will be around other people indoors.    Keep in mind that you may still be able to spread the virus that made you sick, even if you are feeling better. You are likely to be less contagious at this time, depending on factors like how long you were sick or how sick you were.    If you develop a fever or you start to feel worse after you have gone back to normal activities, stay home and away from others again until, for at least 24 hours, both are true: your symptoms are improving overall, and you have not had a fever (and are not using fever-reducing medication). Then take added precaution for the next 5 days.    If you never had symptoms but tested positive for a respiratory virus?, you may be contagious. For the next 5 days: take added precaution, such as taking additional steps for  air, hygiene, masks, physical distancing, and/or testing when you will be around other people indoors. This is especially important  to protect people with factors that increase their risk of severe illness from respiratory viruses.  Avoid immunocompromised, elderly, pregnant women, infants etc     Over-the-counter cold and cough medications    Take Mucinex for cough, drink plenty of fluids with this medication and will help break up congestion making it easier to cough up    For cough can use honey (children ages 1 and up) in hot tea or hot water. I recommend putting this in an insulated cup and carrying it around throughout the day to sip on.  Have it at your bedside at night in case you wake up coughing.  You can also use honey cough drops (adults and older children).    Recommend nasal saline for use in children and adults.  Neti Torres can also be helpful.  (Never used tap water and a Neti pot.  Use distilled water.)    If you have plugged up congested ears or the feeling of fluid in your ears, you can use an over-the-counter nasal steroid spray like fluticasone (brand name Flonase) use 2 sprays into each nostril once or twice a day for 7 days.  This will help open up the eustachian tubes and allow the fluid to drain out of your ears.    Sleeping with your head/chest elevated can help with sinus drainage.    Adults only-can use nasal decongestant (like Afrin) at bedtime to open nasal passages so you can breathe through your nose while you sleep; avoid using for longer than 3 days as this medication can become addicting.  Do not use if you have high blood pressure or high heart rate.    For severe pain or fever in adult-Tylenol (2 extra strength) or ibuprofen (3-4 tabs equals 600 to 800 mg) alternating as needed for pain.  Tylenol doses should be 6 to 8 hours apart and ibuprofen doses should be 6 to 8 hours apart.      Common cold medications for adults explained:    **Cold medications for children are not recommended-only Tylenol, Motrin, honey (only for children older than 1 year), cool-mist humidifier, and nasal saline spray are recommended  for children.    Mucinex-(generic name guaifenesin)-is an expectorant.  This will thin out all the thick mucus.  Must drink plenty of liquids for this medicine to work.    Dextromethorphan (brand name Delsym or DM)-this medicine is a cough suppressant--caution/avoid use if taking SSRI medication because of risk of Serotonin Syndrome    Honey in hot water or tea-this is a natural cough suppressant    Decongestant nasal spray- (eg: Afrin) use for temporary relief of nasal congestion-best when used at bedtime to open up nasal passages so that you are not forced to mouth breathe overnight.    Sudafed (generic name pseudoephedrine-this must be bought from the pharmacist) DO NOT use this medicine if you have high blood pressure as it can raise your blood pressure higher.  Do not use if you have any irregular heart rate.  This medicine can help clear congestion in your sinuses.

## 2025-05-13 ASSESSMENT — ENCOUNTER SYMPTOMS
SORE THROAT: 0
WEIGHT LOSS: 0
HEMOPTYSIS: 0
RHINORRHEA: 0
CHILLS: 1
MYALGIAS: 1
HEARTBURN: 0
COUGH: 1
SWEATS: 0
HEADACHES: 1
WHEEZING: 1
SHORTNESS OF BREATH: 1
FEVER: 0

## 2025-05-14 ENCOUNTER — HOSPITAL ENCOUNTER (OUTPATIENT)
Dept: RADIOLOGY | Facility: CLINIC | Age: 75
Discharge: HOME | End: 2025-05-14
Payer: MEDICARE

## 2025-05-14 ENCOUNTER — OFFICE VISIT (OUTPATIENT)
Dept: PRIMARY CARE | Facility: CLINIC | Age: 75
End: 2025-05-14
Payer: MEDICARE

## 2025-05-14 VITALS
BODY MASS INDEX: 39.95 KG/M2 | WEIGHT: 234 LBS | HEIGHT: 64 IN | OXYGEN SATURATION: 93 % | HEART RATE: 65 BPM | TEMPERATURE: 97.2 F | DIASTOLIC BLOOD PRESSURE: 77 MMHG | SYSTOLIC BLOOD PRESSURE: 118 MMHG

## 2025-05-14 DIAGNOSIS — M54.16 LUMBAR RADICULOPATHY: ICD-10-CM

## 2025-05-14 DIAGNOSIS — E78.5 HYPERLIPIDEMIA, UNSPECIFIED HYPERLIPIDEMIA TYPE: ICD-10-CM

## 2025-05-14 DIAGNOSIS — J18.0 BRONCHOPNEUMONIA: ICD-10-CM

## 2025-05-14 DIAGNOSIS — H35.3290 EXUDATIVE AGE-RELATED MACULAR DEGENERATION, UNSPECIFIED LATERALITY, UNSPECIFIED STAGE (MULTI): ICD-10-CM

## 2025-05-14 DIAGNOSIS — I10 ESSENTIAL HYPERTENSION: Primary | ICD-10-CM

## 2025-05-14 DIAGNOSIS — N18.32 STAGE 3B CHRONIC KIDNEY DISEASE (MULTI): ICD-10-CM

## 2025-05-14 DIAGNOSIS — I10 ESSENTIAL HYPERTENSION: ICD-10-CM

## 2025-05-14 PROCEDURE — 71046 X-RAY EXAM CHEST 2 VIEWS: CPT

## 2025-05-14 PROCEDURE — G2211 COMPLEX E/M VISIT ADD ON: HCPCS | Performed by: FAMILY MEDICINE

## 2025-05-14 PROCEDURE — 71046 X-RAY EXAM CHEST 2 VIEWS: CPT | Performed by: RADIOLOGY

## 2025-05-14 PROCEDURE — 1160F RVW MEDS BY RX/DR IN RCRD: CPT | Performed by: FAMILY MEDICINE

## 2025-05-14 PROCEDURE — 1159F MED LIST DOCD IN RCRD: CPT | Performed by: FAMILY MEDICINE

## 2025-05-14 PROCEDURE — 3074F SYST BP LT 130 MM HG: CPT | Performed by: FAMILY MEDICINE

## 2025-05-14 PROCEDURE — 3008F BODY MASS INDEX DOCD: CPT | Performed by: FAMILY MEDICINE

## 2025-05-14 PROCEDURE — 99214 OFFICE O/P EST MOD 30 MIN: CPT | Performed by: FAMILY MEDICINE

## 2025-05-14 PROCEDURE — 3078F DIAST BP <80 MM HG: CPT | Performed by: FAMILY MEDICINE

## 2025-05-14 RX ORDER — TRIAMTERENE AND HYDROCHLOROTHIAZIDE 37.5; 25 MG/1; MG/1
1 CAPSULE ORAL DAILY
Qty: 90 CAPSULE | Refills: 1 | Status: SHIPPED | OUTPATIENT
Start: 2025-05-14 | End: 2025-05-14 | Stop reason: SDUPTHER

## 2025-05-14 RX ORDER — AMLODIPINE BESYLATE 5 MG/1
5 TABLET ORAL DAILY
Qty: 90 TABLET | Refills: 1 | Status: SHIPPED | OUTPATIENT
Start: 2025-05-14

## 2025-05-14 RX ORDER — BENAZEPRIL HYDROCHLORIDE 20 MG/1
20 TABLET ORAL DAILY
Qty: 90 TABLET | Refills: 1 | Status: SHIPPED | OUTPATIENT
Start: 2025-05-14

## 2025-05-14 RX ORDER — ATORVASTATIN CALCIUM 20 MG/1
20 TABLET, FILM COATED ORAL DAILY
Qty: 90 TABLET | Refills: 1 | Status: SHIPPED | OUTPATIENT
Start: 2025-05-14 | End: 2025-05-14 | Stop reason: SDUPTHER

## 2025-05-14 RX ORDER — ATORVASTATIN CALCIUM 20 MG/1
20 TABLET, FILM COATED ORAL DAILY
Qty: 90 TABLET | Refills: 1 | Status: SHIPPED | OUTPATIENT
Start: 2025-05-14

## 2025-05-14 RX ORDER — MOXIFLOXACIN HYDROCHLORIDE 400 MG/1
400 TABLET ORAL DAILY
Qty: 10 TABLET | Refills: 0 | Status: SHIPPED | OUTPATIENT
Start: 2025-05-14 | End: 2025-05-24

## 2025-05-14 RX ORDER — ALBUTEROL SULFATE 90 UG/1
2 INHALANT RESPIRATORY (INHALATION) EVERY 4 HOURS PRN
Qty: 8 G | Refills: 0 | Status: SHIPPED | OUTPATIENT
Start: 2025-05-14 | End: 2026-05-14

## 2025-05-14 RX ORDER — BENAZEPRIL HYDROCHLORIDE 20 MG/1
20 TABLET ORAL DAILY
Qty: 90 TABLET | Refills: 1 | Status: SHIPPED | OUTPATIENT
Start: 2025-05-14 | End: 2025-05-14 | Stop reason: SDUPTHER

## 2025-05-14 RX ORDER — TRIAMTERENE AND HYDROCHLOROTHIAZIDE 37.5; 25 MG/1; MG/1
1 CAPSULE ORAL DAILY
Qty: 90 CAPSULE | Refills: 1 | Status: SHIPPED | OUTPATIENT
Start: 2025-05-14

## 2025-05-14 RX ORDER — AMLODIPINE BESYLATE 5 MG/1
5 TABLET ORAL DAILY
Qty: 90 TABLET | Refills: 1 | Status: SHIPPED | OUTPATIENT
Start: 2025-05-14 | End: 2025-05-14 | Stop reason: SDUPTHER

## 2025-05-14 ASSESSMENT — PATIENT HEALTH QUESTIONNAIRE - PHQ9
2. FEELING DOWN, DEPRESSED OR HOPELESS: NOT AT ALL
SUM OF ALL RESPONSES TO PHQ9 QUESTIONS 1 & 2: 0
1. LITTLE INTEREST OR PLEASURE IN DOING THINGS: NOT AT ALL

## 2025-05-14 NOTE — PROGRESS NOTES
"Complaint of productive cough chest congestion wheeze and some dyspnea without myalgias arthralgias facial pressure purulent rhinorrhea otalgia or otorrhea this started 7 days ago and is progressively worsened     denies chest pain diaphoresis nausea palpitations syncope presyncope orthopnea edema leg pain dysarthria aphasia focal weakness headache numbness tingling  visual disturbance gait disturbance polydipsia polyuria weight loss tremor epistaxis melena rectal bleeding fatigue weight change temperature intolerance.        /77   Pulse 65   Temp 36.2 °C (97.2 °F)   Ht 1.626 m (5' 4\")   Wt 106 kg (234 lb)   SpO2 93%   BMI 40.17 kg/m²       Nontoxic-appearing  No retractions  Skin without pallor petechia icterus cyanosis  Sinuses nontender  Neck without JVD thyromegaly bruits  Chest with right basilar rales with scattered rhonchi and some expiratory wheezing  Heart regular rate and rhythm without murmur  Abdomen soft nondistended nontender without organomegaly or mass  Extremities without erythema edema Homans or cord  Peripheral pulses palpable  Neuro intact          "

## 2025-05-15 RX ORDER — GABAPENTIN 300 MG/1
600 CAPSULE ORAL 2 TIMES DAILY
Qty: 360 CAPSULE | Refills: 1 | Status: SHIPPED | OUTPATIENT
Start: 2025-05-15

## 2025-05-16 DIAGNOSIS — R05.1 ACUTE COUGH: ICD-10-CM

## 2025-05-16 RX ORDER — BENZONATATE 200 MG/1
200 CAPSULE ORAL 3 TIMES DAILY PRN
Qty: 21 CAPSULE | Refills: 0 | Status: SHIPPED | OUTPATIENT
Start: 2025-05-16 | End: 2025-05-16 | Stop reason: SDUPTHER

## 2025-05-16 RX ORDER — BENZONATATE 200 MG/1
200 CAPSULE ORAL 3 TIMES DAILY PRN
Qty: 21 CAPSULE | Refills: 0 | Status: SHIPPED | OUTPATIENT
Start: 2025-05-16 | End: 2025-05-23

## 2025-05-18 PROBLEM — H35.3290 EXUDATIVE AGE-RELATED MACULAR DEGENERATION, UNSPECIFIED LATERALITY, UNSPECIFIED STAGE (MULTI): Status: ACTIVE | Noted: 2025-05-18

## 2025-06-23 DIAGNOSIS — R32 URINARY INCONTINENCE, UNSPECIFIED TYPE: ICD-10-CM

## 2025-06-23 RX ORDER — OXYBUTYNIN CHLORIDE 5 MG/1
5 TABLET, EXTENDED RELEASE ORAL DAILY
Qty: 90 TABLET | Refills: 1 | Status: SHIPPED | OUTPATIENT
Start: 2025-06-23